# Patient Record
Sex: MALE | Race: WHITE | NOT HISPANIC OR LATINO | Employment: OTHER | URBAN - METROPOLITAN AREA
[De-identification: names, ages, dates, MRNs, and addresses within clinical notes are randomized per-mention and may not be internally consistent; named-entity substitution may affect disease eponyms.]

---

## 2021-08-03 ENCOUNTER — TRANSFERRED RECORDS (OUTPATIENT)
Dept: HEALTH INFORMATION MANAGEMENT | Facility: CLINIC | Age: 61
End: 2021-08-03

## 2021-08-03 LAB
ALT SERPL-CCNC: 27 U/L (ref 12–78)
AST SERPL-CCNC: 16 U/L (ref 15–37)
CREATININE (EXTERNAL): 1.2 MG/DL (ref 0.1–1.3)
GFR ESTIMATED (EXTERNAL): >60 ML/MIN/1.73M2
GFR ESTIMATED (IF AFRICAN AMERICAN) (EXTERNAL): >60 ML/MIN/1.73M2
GLUCOSE (EXTERNAL): 200 MG/DL (ref 70–110)
POTASSIUM (EXTERNAL): 3.1 MMOL/L (ref 3.5–5.2)

## 2021-08-04 ENCOUNTER — APPOINTMENT (OUTPATIENT)
Dept: GENERAL RADIOLOGY | Facility: CLINIC | Age: 61
DRG: 327 | End: 2021-08-04
Attending: THORACIC SURGERY (CARDIOTHORACIC VASCULAR SURGERY)
Payer: COMMERCIAL

## 2021-08-04 ENCOUNTER — TRANSFERRED RECORDS (OUTPATIENT)
Dept: HEALTH INFORMATION MANAGEMENT | Facility: CLINIC | Age: 61
End: 2021-08-04

## 2021-08-04 ENCOUNTER — ANESTHESIA EVENT (OUTPATIENT)
Dept: SURGERY | Facility: CLINIC | Age: 61
DRG: 327 | End: 2021-08-04
Payer: COMMERCIAL

## 2021-08-04 ENCOUNTER — ANESTHESIA (OUTPATIENT)
Dept: SURGERY | Facility: CLINIC | Age: 61
DRG: 327 | End: 2021-08-04
Payer: COMMERCIAL

## 2021-08-04 ENCOUNTER — APPOINTMENT (OUTPATIENT)
Dept: GENERAL RADIOLOGY | Facility: CLINIC | Age: 61
DRG: 327 | End: 2021-08-04
Payer: COMMERCIAL

## 2021-08-04 ENCOUNTER — HOSPITAL ENCOUNTER (INPATIENT)
Facility: CLINIC | Age: 61
LOS: 6 days | Discharge: HOME HEALTH | DRG: 327 | End: 2021-08-10
Attending: EMERGENCY MEDICINE | Admitting: THORACIC SURGERY (CARDIOTHORACIC VASCULAR SURGERY)
Payer: COMMERCIAL

## 2021-08-04 DIAGNOSIS — K22.89: Primary | ICD-10-CM

## 2021-08-04 DIAGNOSIS — K22.3 ESOPHAGEAL RUPTURE: ICD-10-CM

## 2021-08-04 DIAGNOSIS — J98.51 MEDIASTINITIS: ICD-10-CM

## 2021-08-04 DIAGNOSIS — Z11.52 ENCOUNTER FOR SCREENING LABORATORY TESTING FOR SEVERE ACUTE RESPIRATORY SYNDROME CORONAVIRUS 2 (SARS-COV-2): ICD-10-CM

## 2021-08-04 LAB
ABO/RH(D): NORMAL
ALBUMIN SERPL-MCNC: 4 G/DL (ref 3.4–5)
ALP SERPL-CCNC: 44 U/L (ref 40–150)
ALT SERPL W P-5'-P-CCNC: 26 U/L (ref 0–70)
ANION GAP SERPL CALCULATED.3IONS-SCNC: 3 MMOL/L (ref 3–14)
ANION GAP SERPL CALCULATED.3IONS-SCNC: 7 MMOL/L (ref 3–14)
ANTIBODY SCREEN: NEGATIVE
APTT PPP: 25 SECONDS (ref 22–38)
AST SERPL W P-5'-P-CCNC: 13 U/L (ref 0–45)
BASOPHILS # BLD AUTO: 0 10E3/UL (ref 0–0.2)
BASOPHILS NFR BLD AUTO: 0 %
BILIRUB SERPL-MCNC: 0.9 MG/DL (ref 0.2–1.3)
BLD PROD TYP BPU: NORMAL
BLD PROD TYP BPU: NORMAL
BLOOD COMPONENT TYPE: NORMAL
BLOOD COMPONENT TYPE: NORMAL
BUN SERPL-MCNC: 24 MG/DL (ref 7–30)
BUN SERPL-MCNC: 24 MG/DL (ref 7–30)
CALCIUM SERPL-MCNC: 8.6 MG/DL (ref 8.5–10.1)
CALCIUM SERPL-MCNC: 9 MG/DL (ref 8.5–10.1)
CHLORIDE BLD-SCNC: 110 MMOL/L (ref 94–109)
CHLORIDE BLD-SCNC: 110 MMOL/L (ref 94–109)
CO2 SERPL-SCNC: 24 MMOL/L (ref 20–32)
CO2 SERPL-SCNC: 29 MMOL/L (ref 20–32)
CODING SYSTEM: NORMAL
CODING SYSTEM: NORMAL
CREAT SERPL-MCNC: 0.95 MG/DL (ref 0.66–1.25)
CREAT SERPL-MCNC: 0.97 MG/DL (ref 0.66–1.25)
CROSSMATCH: NORMAL
CROSSMATCH: NORMAL
EOSINOPHIL # BLD AUTO: 0 10E3/UL (ref 0–0.7)
EOSINOPHIL NFR BLD AUTO: 0 %
ERYTHROCYTE [DISTWIDTH] IN BLOOD BY AUTOMATED COUNT: 13.2 % (ref 10–15)
ERYTHROCYTE [DISTWIDTH] IN BLOOD BY AUTOMATED COUNT: 13.4 % (ref 10–15)
GFR SERPL CREATININE-BSD FRML MDRD: 83 ML/MIN/1.73M2
GFR SERPL CREATININE-BSD FRML MDRD: 85 ML/MIN/1.73M2
GLUCOSE BLD-MCNC: 198 MG/DL (ref 70–99)
GLUCOSE BLD-MCNC: 232 MG/DL (ref 70–99)
GLUCOSE BLDC GLUCOMTR-MCNC: 162 MG/DL (ref 70–99)
GLUCOSE BLDC GLUCOMTR-MCNC: 224 MG/DL (ref 70–99)
HBA1C MFR BLD: 6 % (ref 0–5.6)
HCT VFR BLD AUTO: 47.6 % (ref 40–53)
HCT VFR BLD AUTO: 49.8 % (ref 40–53)
HGB BLD-MCNC: 13.2 G/DL (ref 13.3–17.7)
HGB BLD-MCNC: 15.9 G/DL (ref 13.3–17.7)
HGB BLD-MCNC: 17 G/DL (ref 13.3–17.7)
HOLD SPECIMEN: NORMAL
IMM GRANULOCYTES # BLD: 0.2 10E3/UL
IMM GRANULOCYTES NFR BLD: 1 %
INR PPP: 1.09 (ref 0.85–1.15)
ISSUE DATE AND TIME: NORMAL
ISSUE DATE AND TIME: NORMAL
LYMPHOCYTES # BLD AUTO: 0.5 10E3/UL (ref 0.8–5.3)
LYMPHOCYTES NFR BLD AUTO: 2 %
MCH RBC QN AUTO: 30.1 PG (ref 26.5–33)
MCH RBC QN AUTO: 30.3 PG (ref 26.5–33)
MCHC RBC AUTO-ENTMCNC: 33.4 G/DL (ref 31.5–36.5)
MCHC RBC AUTO-ENTMCNC: 34.1 G/DL (ref 31.5–36.5)
MCV RBC AUTO: 89 FL (ref 78–100)
MCV RBC AUTO: 90 FL (ref 78–100)
MONOCYTES # BLD AUTO: 1 10E3/UL (ref 0–1.3)
MONOCYTES NFR BLD AUTO: 5 %
NEUTROPHILS # BLD AUTO: 18.6 10E3/UL (ref 1.6–8.3)
NEUTROPHILS NFR BLD AUTO: 92 %
NRBC # BLD AUTO: 0 10E3/UL
NRBC BLD AUTO-RTO: 0 /100
PLATELET # BLD AUTO: 247 10E3/UL (ref 150–450)
PLATELET # BLD AUTO: 250 10E3/UL (ref 150–450)
POTASSIUM BLD-SCNC: 3.7 MMOL/L (ref 3.4–5.3)
POTASSIUM BLD-SCNC: 3.8 MMOL/L (ref 3.4–5.3)
PROT SERPL-MCNC: 7.6 G/DL (ref 6.8–8.8)
RBC # BLD AUTO: 5.28 10E6/UL (ref 4.4–5.9)
RBC # BLD AUTO: 5.61 10E6/UL (ref 4.4–5.9)
SARS-COV-2 RNA RESP QL NAA+PROBE: NEGATIVE
SODIUM SERPL-SCNC: 141 MMOL/L (ref 133–144)
SODIUM SERPL-SCNC: 142 MMOL/L (ref 133–144)
SPECIMEN EXPIRATION DATE: NORMAL
UNIT ABO/RH: NORMAL
UNIT ABO/RH: NORMAL
UNIT NUMBER: NORMAL
UNIT NUMBER: NORMAL
UNIT STATUS: NORMAL
UNIT STATUS: NORMAL
UNIT TYPE ISBT: 6200
UNIT TYPE ISBT: 6200
WBC # BLD AUTO: 16.2 10E3/UL (ref 4–11)
WBC # BLD AUTO: 20.3 10E3/UL (ref 4–11)

## 2021-08-04 PROCEDURE — 85610 PROTHROMBIN TIME: CPT | Performed by: EMERGENCY MEDICINE

## 2021-08-04 PROCEDURE — 258N000003 HC RX IP 258 OP 636: Performed by: THORACIC SURGERY (CARDIOTHORACIC VASCULAR SURGERY)

## 2021-08-04 PROCEDURE — 250N000011 HC RX IP 250 OP 636: Performed by: STUDENT IN AN ORGANIZED HEALTH CARE EDUCATION/TRAINING PROGRAM

## 2021-08-04 PROCEDURE — 999N000141 HC STATISTIC PRE-PROCEDURE NURSING ASSESSMENT: Performed by: THORACIC SURGERY (CARDIOTHORACIC VASCULAR SURGERY)

## 2021-08-04 PROCEDURE — 80053 COMPREHEN METABOLIC PANEL: CPT | Performed by: EMERGENCY MEDICINE

## 2021-08-04 PROCEDURE — 999N000065 XR CHEST PORT 1 VIEW

## 2021-08-04 PROCEDURE — P9016 RBC LEUKOCYTES REDUCED: HCPCS | Performed by: EMERGENCY MEDICINE

## 2021-08-04 PROCEDURE — 86900 BLOOD TYPING SEROLOGIC ABO: CPT | Performed by: EMERGENCY MEDICINE

## 2021-08-04 PROCEDURE — C1769 GUIDE WIRE: HCPCS | Performed by: THORACIC SURGERY (CARDIOTHORACIC VASCULAR SURGERY)

## 2021-08-04 PROCEDURE — 120N000003 HC R&B IMCU UMMC

## 2021-08-04 PROCEDURE — C1894 INTRO/SHEATH, NON-LASER: HCPCS | Performed by: THORACIC SURGERY (CARDIOTHORACIC VASCULAR SURGERY)

## 2021-08-04 PROCEDURE — C9803 HOPD COVID-19 SPEC COLLECT: HCPCS | Performed by: EMERGENCY MEDICINE

## 2021-08-04 PROCEDURE — 710N000011 HC RECOVERY PHASE 1, LEVEL 3, PER MIN: Performed by: THORACIC SURGERY (CARDIOTHORACIC VASCULAR SURGERY)

## 2021-08-04 PROCEDURE — 999N000179 XR SURGERY CARM FLUORO LESS THAN 5 MIN W STILLS: Mod: TC

## 2021-08-04 PROCEDURE — 99285 EMERGENCY DEPT VISIT HI MDM: CPT | Mod: 25 | Performed by: EMERGENCY MEDICINE

## 2021-08-04 PROCEDURE — 86923 COMPATIBILITY TEST ELECTRIC: CPT | Performed by: EMERGENCY MEDICINE

## 2021-08-04 PROCEDURE — 250N000009 HC RX 250: Performed by: NURSE ANESTHETIST, CERTIFIED REGISTERED

## 2021-08-04 PROCEDURE — 250N000013 HC RX MED GY IP 250 OP 250 PS 637

## 2021-08-04 PROCEDURE — 999N000015 HC STATISTIC ARTERIAL MONITORING DAILY

## 2021-08-04 PROCEDURE — 0W9B3ZZ DRAINAGE OF LEFT PLEURAL CAVITY, PERCUTANEOUS APPROACH: ICD-10-PCS | Performed by: THORACIC SURGERY (CARDIOTHORACIC VASCULAR SURGERY)

## 2021-08-04 PROCEDURE — 42955 SURGICAL OPENING OF THROAT: CPT | Mod: GC | Performed by: THORACIC SURGERY (CARDIOTHORACIC VASCULAR SURGERY)

## 2021-08-04 PROCEDURE — 36415 COLL VENOUS BLD VENIPUNCTURE: CPT | Performed by: EMERGENCY MEDICINE

## 2021-08-04 PROCEDURE — 360N000078 HC SURGERY LEVEL 5, PER MIN: Performed by: THORACIC SURGERY (CARDIOTHORACIC VASCULAR SURGERY)

## 2021-08-04 PROCEDURE — 85025 COMPLETE CBC W/AUTO DIFF WBC: CPT | Performed by: EMERGENCY MEDICINE

## 2021-08-04 PROCEDURE — 250N000013 HC RX MED GY IP 250 OP 250 PS 637: Performed by: STUDENT IN AN ORGANIZED HEALTH CARE EDUCATION/TRAINING PROGRAM

## 2021-08-04 PROCEDURE — 258N000003 HC RX IP 258 OP 636: Performed by: STUDENT IN AN ORGANIZED HEALTH CARE EDUCATION/TRAINING PROGRAM

## 2021-08-04 PROCEDURE — 272N000001 HC OR GENERAL SUPPLY STERILE: Performed by: THORACIC SURGERY (CARDIOTHORACIC VASCULAR SURGERY)

## 2021-08-04 PROCEDURE — 85730 THROMBOPLASTIN TIME PARTIAL: CPT | Performed by: EMERGENCY MEDICINE

## 2021-08-04 PROCEDURE — 250N000011 HC RX IP 250 OP 636: Performed by: THORACIC SURGERY (CARDIOTHORACIC VASCULAR SURGERY)

## 2021-08-04 PROCEDURE — 43246 EGD PLACE GASTROSTOMY TUBE: CPT | Mod: GC | Performed by: THORACIC SURGERY (CARDIOTHORACIC VASCULAR SURGERY)

## 2021-08-04 PROCEDURE — 258N000003 HC RX IP 258 OP 636: Performed by: ANESTHESIOLOGY

## 2021-08-04 PROCEDURE — 99291 CRITICAL CARE FIRST HOUR: CPT | Performed by: EMERGENCY MEDICINE

## 2021-08-04 PROCEDURE — 0DC28ZZ EXTIRPATION OF MATTER FROM MIDDLE ESOPHAGUS, VIA NATURAL OR ARTIFICIAL OPENING ENDOSCOPIC: ICD-10-PCS | Performed by: THORACIC SURGERY (CARDIOTHORACIC VASCULAR SURGERY)

## 2021-08-04 PROCEDURE — 71045 X-RAY EXAM CHEST 1 VIEW: CPT | Mod: 26 | Performed by: STUDENT IN AN ORGANIZED HEALTH CARE EDUCATION/TRAINING PROGRAM

## 2021-08-04 PROCEDURE — 0D153J4 BYPASS ESOPHAGUS TO CUTANEOUS WITH SYNTHETIC SUBSTITUTE, PERCUTANEOUS APPROACH: ICD-10-PCS | Performed by: THORACIC SURGERY (CARDIOTHORACIC VASCULAR SURGERY)

## 2021-08-04 PROCEDURE — 0DHA3UZ INSERTION OF FEEDING DEVICE INTO JEJUNUM, PERCUTANEOUS APPROACH: ICD-10-PCS | Performed by: THORACIC SURGERY (CARDIOTHORACIC VASCULAR SURGERY)

## 2021-08-04 PROCEDURE — 85018 HEMOGLOBIN: CPT | Performed by: STUDENT IN AN ORGANIZED HEALTH CARE EDUCATION/TRAINING PROGRAM

## 2021-08-04 PROCEDURE — 250N000024 HC ISOFLURANE, PER MIN: Performed by: THORACIC SURGERY (CARDIOTHORACIC VASCULAR SURGERY)

## 2021-08-04 PROCEDURE — 36592 COLLECT BLOOD FROM PICC: CPT | Performed by: STUDENT IN AN ORGANIZED HEALTH CARE EDUCATION/TRAINING PROGRAM

## 2021-08-04 PROCEDURE — 250N000011 HC RX IP 250 OP 636: Performed by: NURSE ANESTHETIST, CERTIFIED REGISTERED

## 2021-08-04 PROCEDURE — 250N000011 HC RX IP 250 OP 636: Performed by: ANESTHESIOLOGY

## 2021-08-04 PROCEDURE — 0W993ZZ DRAINAGE OF RIGHT PLEURAL CAVITY, PERCUTANEOUS APPROACH: ICD-10-PCS | Performed by: THORACIC SURGERY (CARDIOTHORACIC VASCULAR SURGERY)

## 2021-08-04 PROCEDURE — U0005 INFEC AGEN DETEC AMPLI PROBE: HCPCS | Performed by: EMERGENCY MEDICINE

## 2021-08-04 PROCEDURE — 370N000017 HC ANESTHESIA TECHNICAL FEE, PER MIN: Performed by: THORACIC SURGERY (CARDIOTHORACIC VASCULAR SURGERY)

## 2021-08-04 PROCEDURE — 999N000157 HC STATISTIC RCP TIME EA 10 MIN

## 2021-08-04 PROCEDURE — 250N000009 HC RX 250: Performed by: ANESTHESIOLOGY

## 2021-08-04 PROCEDURE — 258N000003 HC RX IP 258 OP 636: Performed by: NURSE ANESTHETIST, CERTIFIED REGISTERED

## 2021-08-04 PROCEDURE — 85041 AUTOMATED RBC COUNT: CPT | Performed by: STUDENT IN AN ORGANIZED HEALTH CARE EDUCATION/TRAINING PROGRAM

## 2021-08-04 PROCEDURE — 83036 HEMOGLOBIN GLYCOSYLATED A1C: CPT

## 2021-08-04 RX ORDER — ONDANSETRON 4 MG/1
4 TABLET, ORALLY DISINTEGRATING ORAL EVERY 30 MIN PRN
Status: DISCONTINUED | OUTPATIENT
Start: 2021-08-04 | End: 2021-08-04 | Stop reason: HOSPADM

## 2021-08-04 RX ORDER — FENTANYL CITRATE 50 UG/ML
INJECTION, SOLUTION INTRAMUSCULAR; INTRAVENOUS PRN
Status: DISCONTINUED | OUTPATIENT
Start: 2021-08-04 | End: 2021-08-04

## 2021-08-04 RX ORDER — ACETAMINOPHEN 325 MG/1
975 TABLET ORAL EVERY 6 HOURS PRN
Status: DISCONTINUED | OUTPATIENT
Start: 2021-08-04 | End: 2021-08-04 | Stop reason: HOSPADM

## 2021-08-04 RX ORDER — PANTOPRAZOLE SODIUM 40 MG/1
40 TABLET, DELAYED RELEASE ORAL DAILY
Status: DISCONTINUED | OUTPATIENT
Start: 2021-08-04 | End: 2021-08-05

## 2021-08-04 RX ORDER — OXYCODONE HYDROCHLORIDE 5 MG/1
5 TABLET ORAL EVERY 4 HOURS PRN
Status: DISCONTINUED | OUTPATIENT
Start: 2021-08-04 | End: 2021-08-05

## 2021-08-04 RX ORDER — ONDANSETRON 2 MG/ML
4 INJECTION INTRAMUSCULAR; INTRAVENOUS EVERY 6 HOURS PRN
Status: DISCONTINUED | OUTPATIENT
Start: 2021-08-04 | End: 2021-08-10 | Stop reason: HOSPADM

## 2021-08-04 RX ORDER — ONDANSETRON 2 MG/ML
INJECTION INTRAMUSCULAR; INTRAVENOUS PRN
Status: DISCONTINUED | OUTPATIENT
Start: 2021-08-04 | End: 2021-08-04

## 2021-08-04 RX ORDER — NALOXONE HYDROCHLORIDE 0.4 MG/ML
0.4 INJECTION, SOLUTION INTRAMUSCULAR; INTRAVENOUS; SUBCUTANEOUS
Status: DISCONTINUED | OUTPATIENT
Start: 2021-08-04 | End: 2021-08-10 | Stop reason: HOSPADM

## 2021-08-04 RX ORDER — DIMENHYDRINATE 50 MG/ML
25 INJECTION, SOLUTION INTRAMUSCULAR; INTRAVENOUS
Status: DISCONTINUED | OUTPATIENT
Start: 2021-08-04 | End: 2021-08-04 | Stop reason: HOSPADM

## 2021-08-04 RX ORDER — FLUCONAZOLE 2 MG/ML
200 INJECTION, SOLUTION INTRAVENOUS EVERY 24 HOURS
Status: DISCONTINUED | OUTPATIENT
Start: 2021-08-04 | End: 2021-08-07

## 2021-08-04 RX ORDER — HYDRALAZINE HYDROCHLORIDE 20 MG/ML
2.5-5 INJECTION INTRAMUSCULAR; INTRAVENOUS EVERY 10 MIN PRN
Status: DISCONTINUED | OUTPATIENT
Start: 2021-08-04 | End: 2021-08-04 | Stop reason: HOSPADM

## 2021-08-04 RX ORDER — LISINOPRIL/HYDROCHLOROTHIAZIDE 10-12.5 MG
1 TABLET ORAL DAILY
Status: ON HOLD | COMMUNITY
End: 2021-08-10

## 2021-08-04 RX ORDER — ACETAMINOPHEN 325 MG/10.15ML
650 LIQUID ORAL EVERY 4 HOURS PRN
Status: DISCONTINUED | OUTPATIENT
Start: 2021-08-04 | End: 2021-08-06

## 2021-08-04 RX ORDER — LABETALOL 20 MG/4 ML (5 MG/ML) INTRAVENOUS SYRINGE
PRN
Status: DISCONTINUED | OUTPATIENT
Start: 2021-08-04 | End: 2021-08-04

## 2021-08-04 RX ORDER — FENTANYL CITRATE 50 UG/ML
25 INJECTION, SOLUTION INTRAMUSCULAR; INTRAVENOUS EVERY 5 MIN PRN
Status: DISCONTINUED | OUTPATIENT
Start: 2021-08-04 | End: 2021-08-04 | Stop reason: HOSPADM

## 2021-08-04 RX ORDER — HYDROMORPHONE HCL IN WATER/PF 6 MG/30 ML
0.2 PATIENT CONTROLLED ANALGESIA SYRINGE INTRAVENOUS
Status: DISCONTINUED | OUTPATIENT
Start: 2021-08-04 | End: 2021-08-10 | Stop reason: HOSPADM

## 2021-08-04 RX ORDER — HYDROMORPHONE HCL IN WATER/PF 6 MG/30 ML
0.4 PATIENT CONTROLLED ANALGESIA SYRINGE INTRAVENOUS
Status: DISCONTINUED | OUTPATIENT
Start: 2021-08-04 | End: 2021-08-10 | Stop reason: HOSPADM

## 2021-08-04 RX ORDER — NALOXONE HYDROCHLORIDE 0.4 MG/ML
0.2 INJECTION, SOLUTION INTRAMUSCULAR; INTRAVENOUS; SUBCUTANEOUS
Status: DISCONTINUED | OUTPATIENT
Start: 2021-08-04 | End: 2021-08-10 | Stop reason: HOSPADM

## 2021-08-04 RX ORDER — LABETALOL HYDROCHLORIDE 5 MG/ML
10 INJECTION, SOLUTION INTRAVENOUS
Status: COMPLETED | OUTPATIENT
Start: 2021-08-04 | End: 2021-08-04

## 2021-08-04 RX ORDER — HYDROMORPHONE HYDROCHLORIDE 1 MG/ML
0.2 INJECTION, SOLUTION INTRAMUSCULAR; INTRAVENOUS; SUBCUTANEOUS EVERY 5 MIN PRN
Status: DISCONTINUED | OUTPATIENT
Start: 2021-08-04 | End: 2021-08-04

## 2021-08-04 RX ORDER — ONDANSETRON 2 MG/ML
4 INJECTION INTRAMUSCULAR; INTRAVENOUS EVERY 30 MIN PRN
Status: DISCONTINUED | OUTPATIENT
Start: 2021-08-04 | End: 2021-08-04 | Stop reason: HOSPADM

## 2021-08-04 RX ORDER — DEXTROSE MONOHYDRATE 25 G/50ML
25-50 INJECTION, SOLUTION INTRAVENOUS
Status: DISCONTINUED | OUTPATIENT
Start: 2021-08-04 | End: 2021-08-10 | Stop reason: HOSPADM

## 2021-08-04 RX ORDER — PROPOFOL 10 MG/ML
INJECTION, EMULSION INTRAVENOUS PRN
Status: DISCONTINUED | OUTPATIENT
Start: 2021-08-04 | End: 2021-08-04

## 2021-08-04 RX ORDER — NICOTINE POLACRILEX 4 MG
15-30 LOZENGE BUCCAL
Status: DISCONTINUED | OUTPATIENT
Start: 2021-08-04 | End: 2021-08-10 | Stop reason: HOSPADM

## 2021-08-04 RX ORDER — DEXMEDETOMIDINE HYDROCHLORIDE 4 UG/ML
0.2-0.7 INJECTION, SOLUTION INTRAVENOUS CONTINUOUS
Status: DISCONTINUED | OUTPATIENT
Start: 2021-08-04 | End: 2021-08-04 | Stop reason: HOSPADM

## 2021-08-04 RX ORDER — FLUCONAZOLE 2 MG/ML
200 INJECTION, SOLUTION INTRAVENOUS EVERY 24 HOURS
Status: DISCONTINUED | OUTPATIENT
Start: 2021-08-04 | End: 2021-08-04

## 2021-08-04 RX ORDER — POLYETHYLENE GLYCOL 3350 17 G/17G
17 POWDER, FOR SOLUTION ORAL DAILY
Status: DISCONTINUED | OUTPATIENT
Start: 2021-08-05 | End: 2021-08-10 | Stop reason: HOSPADM

## 2021-08-04 RX ORDER — ALBUTEROL SULFATE 0.83 MG/ML
2.5 SOLUTION RESPIRATORY (INHALATION) EVERY 4 HOURS PRN
Status: DISCONTINUED | OUTPATIENT
Start: 2021-08-04 | End: 2021-08-04 | Stop reason: HOSPADM

## 2021-08-04 RX ORDER — DIPHENHYDRAMINE HYDROCHLORIDE 50 MG/ML
25 INJECTION INTRAMUSCULAR; INTRAVENOUS EVERY 6 HOURS PRN
Status: DISCONTINUED | OUTPATIENT
Start: 2021-08-04 | End: 2021-08-04 | Stop reason: HOSPADM

## 2021-08-04 RX ORDER — DEXTROSE MONOHYDRATE, SODIUM CHLORIDE, AND POTASSIUM CHLORIDE 50; 1.49; 4.5 G/1000ML; G/1000ML; G/1000ML
INJECTION, SOLUTION INTRAVENOUS CONTINUOUS
Status: DISCONTINUED | OUTPATIENT
Start: 2021-08-04 | End: 2021-08-06

## 2021-08-04 RX ORDER — LIDOCAINE HYDROCHLORIDE 20 MG/ML
INJECTION, SOLUTION INFILTRATION; PERINEURAL PRN
Status: DISCONTINUED | OUTPATIENT
Start: 2021-08-04 | End: 2021-08-04

## 2021-08-04 RX ORDER — ONDANSETRON 4 MG/1
4 TABLET, ORALLY DISINTEGRATING ORAL EVERY 6 HOURS PRN
Status: DISCONTINUED | OUTPATIENT
Start: 2021-08-04 | End: 2021-08-10 | Stop reason: HOSPADM

## 2021-08-04 RX ORDER — PIPERACILLIN SODIUM, TAZOBACTAM SODIUM 3; .375 G/15ML; G/15ML
3.38 INJECTION, POWDER, LYOPHILIZED, FOR SOLUTION INTRAVENOUS EVERY 6 HOURS
Status: DISCONTINUED | OUTPATIENT
Start: 2021-08-04 | End: 2021-08-06

## 2021-08-04 RX ORDER — PROCHLORPERAZINE MALEATE 5 MG
10 TABLET ORAL EVERY 6 HOURS PRN
Status: DISCONTINUED | OUTPATIENT
Start: 2021-08-04 | End: 2021-08-10 | Stop reason: HOSPADM

## 2021-08-04 RX ORDER — PIPERACILLIN SODIUM, TAZOBACTAM SODIUM 3; .375 G/15ML; G/15ML
INJECTION, POWDER, LYOPHILIZED, FOR SOLUTION INTRAVENOUS PRN
Status: DISCONTINUED | OUTPATIENT
Start: 2021-08-04 | End: 2021-08-04

## 2021-08-04 RX ORDER — LIDOCAINE 4 G/G
1 PATCH TOPICAL
Status: COMPLETED | OUTPATIENT
Start: 2021-08-04 | End: 2021-08-05

## 2021-08-04 RX ORDER — DIPHENHYDRAMINE HCL 25 MG
25 CAPSULE ORAL EVERY 6 HOURS PRN
Status: DISCONTINUED | OUTPATIENT
Start: 2021-08-04 | End: 2021-08-04 | Stop reason: HOSPADM

## 2021-08-04 RX ORDER — DEXTROSE MONOHYDRATE 100 MG/ML
INJECTION, SOLUTION INTRAVENOUS CONTINUOUS PRN
Status: DISCONTINUED | OUTPATIENT
Start: 2021-08-04 | End: 2021-08-10 | Stop reason: HOSPADM

## 2021-08-04 RX ORDER — HYDROMORPHONE HCL IN WATER/PF 6 MG/30 ML
.2-.4 PATIENT CONTROLLED ANALGESIA SYRINGE INTRAVENOUS
Status: DISCONTINUED | OUTPATIENT
Start: 2021-08-04 | End: 2021-08-04

## 2021-08-04 RX ORDER — HALOPERIDOL 5 MG/ML
1 INJECTION INTRAMUSCULAR
Status: COMPLETED | OUTPATIENT
Start: 2021-08-04 | End: 2021-08-04

## 2021-08-04 RX ORDER — SODIUM CHLORIDE, SODIUM LACTATE, POTASSIUM CHLORIDE, CALCIUM CHLORIDE 600; 310; 30; 20 MG/100ML; MG/100ML; MG/100ML; MG/100ML
INJECTION, SOLUTION INTRAVENOUS CONTINUOUS
Status: DISCONTINUED | OUTPATIENT
Start: 2021-08-04 | End: 2021-08-04 | Stop reason: HOSPADM

## 2021-08-04 RX ADMIN — HYDROMORPHONE HYDROCHLORIDE 0.2 MG: 0.2 INJECTION, SOLUTION INTRAMUSCULAR; INTRAVENOUS; SUBCUTANEOUS at 21:07

## 2021-08-04 RX ADMIN — ROCURONIUM BROMIDE 50 MG: 10 INJECTION INTRAVENOUS at 09:29

## 2021-08-04 RX ADMIN — LABETALOL 20 MG/4 ML (5 MG/ML) INTRAVENOUS SYRINGE 20 MG: at 12:12

## 2021-08-04 RX ADMIN — LIDOCAINE 1 PATCH: 246 PATCH TOPICAL at 19:36

## 2021-08-04 RX ADMIN — HALOPERIDOL LACTATE 1 MG: 5 INJECTION, SOLUTION INTRAMUSCULAR at 12:15

## 2021-08-04 RX ADMIN — ACETAMINOPHEN 650 MG: 325 SOLUTION ORAL at 16:45

## 2021-08-04 RX ADMIN — LIDOCAINE HYDROCHLORIDE 100 MG: 20 INJECTION, SOLUTION INFILTRATION; PERINEURAL at 09:29

## 2021-08-04 RX ADMIN — FENTANYL CITRATE 25 MCG: 50 INJECTION, SOLUTION INTRAMUSCULAR; INTRAVENOUS at 13:30

## 2021-08-04 RX ADMIN — ONDANSETRON 4 MG: 2 INJECTION INTRAMUSCULAR; INTRAVENOUS at 09:12

## 2021-08-04 RX ADMIN — PROPOFOL 180 MG: 10 INJECTION, EMULSION INTRAVENOUS at 09:29

## 2021-08-04 RX ADMIN — PHENYLEPHRINE HYDROCHLORIDE 100 MCG: 10 INJECTION INTRAVENOUS at 10:07

## 2021-08-04 RX ADMIN — FENTANYL CITRATE 25 MCG: 50 INJECTION, SOLUTION INTRAMUSCULAR; INTRAVENOUS at 12:30

## 2021-08-04 RX ADMIN — PANTOPRAZOLE SODIUM 40 MG: 40 TABLET, DELAYED RELEASE ORAL at 16:45

## 2021-08-04 RX ADMIN — MIDAZOLAM 1 MG: 1 INJECTION INTRAMUSCULAR; INTRAVENOUS at 09:17

## 2021-08-04 RX ADMIN — LABETALOL HYDROCHLORIDE 10 MG: 5 INJECTION, SOLUTION INTRAVENOUS at 13:32

## 2021-08-04 RX ADMIN — FENTANYL CITRATE 50 MCG: 50 INJECTION, SOLUTION INTRAMUSCULAR; INTRAVENOUS at 10:34

## 2021-08-04 RX ADMIN — PIPERACILLIN AND TAZOBACTAM 3.38 G: 3; .375 INJECTION, POWDER, FOR SOLUTION INTRAVENOUS at 10:20

## 2021-08-04 RX ADMIN — PHENYLEPHRINE HYDROCHLORIDE 200 MCG: 10 INJECTION INTRAVENOUS at 10:35

## 2021-08-04 RX ADMIN — HYDROMORPHONE HYDROCHLORIDE 0.1 MG: 1 INJECTION, SOLUTION INTRAMUSCULAR; INTRAVENOUS; SUBCUTANEOUS at 12:30

## 2021-08-04 RX ADMIN — ENOXAPARIN SODIUM 40 MG: 40 INJECTION, SOLUTION INTRAVENOUS; SUBCUTANEOUS at 08:19

## 2021-08-04 RX ADMIN — FLUCONAZOLE IN SODIUM CHLORIDE 200 MG: 2 INJECTION, SOLUTION INTRAVENOUS at 15:05

## 2021-08-04 RX ADMIN — MIDAZOLAM 1 MG: 1 INJECTION INTRAMUSCULAR; INTRAVENOUS at 09:12

## 2021-08-04 RX ADMIN — HYDROMORPHONE HYDROCHLORIDE 0.2 MG: 0.2 INJECTION, SOLUTION INTRAMUSCULAR; INTRAVENOUS; SUBCUTANEOUS at 19:02

## 2021-08-04 RX ADMIN — SODIUM CHLORIDE, POTASSIUM CHLORIDE, SODIUM LACTATE AND CALCIUM CHLORIDE: 600; 310; 30; 20 INJECTION, SOLUTION INTRAVENOUS at 08:45

## 2021-08-04 RX ADMIN — FENTANYL CITRATE 100 MCG: 50 INJECTION, SOLUTION INTRAMUSCULAR; INTRAVENOUS at 11:30

## 2021-08-04 RX ADMIN — DEXMEDETOMIDINE 0.2 MCG/KG/HR: 100 INJECTION, SOLUTION, CONCENTRATE INTRAVENOUS at 13:14

## 2021-08-04 RX ADMIN — FENTANYL CITRATE 25 MCG: 50 INJECTION, SOLUTION INTRAMUSCULAR; INTRAVENOUS at 13:25

## 2021-08-04 RX ADMIN — Medication 12 MCG: at 12:28

## 2021-08-04 RX ADMIN — LABETALOL 20 MG/4 ML (5 MG/ML) INTRAVENOUS SYRINGE 20 MG: at 12:01

## 2021-08-04 RX ADMIN — ROCURONIUM BROMIDE 30 MG: 10 INJECTION INTRAVENOUS at 10:34

## 2021-08-04 RX ADMIN — SUGAMMADEX 200 MG: 100 INJECTION, SOLUTION INTRAVENOUS at 11:55

## 2021-08-04 RX ADMIN — PHENYLEPHRINE HYDROCHLORIDE 100 MCG: 10 INJECTION INTRAVENOUS at 10:16

## 2021-08-04 RX ADMIN — FENTANYL CITRATE 200 MCG: 50 INJECTION, SOLUTION INTRAMUSCULAR; INTRAVENOUS at 09:29

## 2021-08-04 RX ADMIN — HYDROMORPHONE HYDROCHLORIDE 0.5 MG: 1 INJECTION, SOLUTION INTRAMUSCULAR; INTRAVENOUS; SUBCUTANEOUS at 12:04

## 2021-08-04 RX ADMIN — POTASSIUM CHLORIDE, DEXTROSE MONOHYDRATE AND SODIUM CHLORIDE: 150; 5; 450 INJECTION, SOLUTION INTRAVENOUS at 15:26

## 2021-08-04 RX ADMIN — HYDROMORPHONE HYDROCHLORIDE 0.2 MG: 1 INJECTION, SOLUTION INTRAMUSCULAR; INTRAVENOUS; SUBCUTANEOUS at 12:20

## 2021-08-04 RX ADMIN — FENTANYL CITRATE 25 MCG: 50 INJECTION, SOLUTION INTRAMUSCULAR; INTRAVENOUS at 12:58

## 2021-08-04 RX ADMIN — FENTANYL CITRATE 25 MCG: 50 INJECTION, SOLUTION INTRAMUSCULAR; INTRAVENOUS at 12:25

## 2021-08-04 RX ADMIN — VANCOMYCIN HYDROCHLORIDE 1250 MG: 100 INJECTION, POWDER, LYOPHILIZED, FOR SOLUTION INTRAVENOUS at 16:24

## 2021-08-04 RX ADMIN — ACETAMINOPHEN 650 MG: 325 SOLUTION ORAL at 21:06

## 2021-08-04 RX ADMIN — FENTANYL CITRATE 25 MCG: 50 INJECTION, SOLUTION INTRAMUSCULAR; INTRAVENOUS at 13:03

## 2021-08-04 RX ADMIN — LABETALOL 20 MG/4 ML (5 MG/ML) INTRAVENOUS SYRINGE 10 MG: at 11:55

## 2021-08-04 RX ADMIN — HYDROMORPHONE HYDROCHLORIDE 0.2 MG: 0.2 INJECTION, SOLUTION INTRAMUSCULAR; INTRAVENOUS; SUBCUTANEOUS at 16:45

## 2021-08-04 RX ADMIN — HYDROMORPHONE HYDROCHLORIDE 0.2 MG: 1 INJECTION, SOLUTION INTRAMUSCULAR; INTRAVENOUS; SUBCUTANEOUS at 12:25

## 2021-08-04 ASSESSMENT — ENCOUNTER SYMPTOMS
DYSURIA: 0
VOMITING: 0
SHORTNESS OF BREATH: 0
BACK PAIN: 1
DIZZINESS: 0
ABDOMINAL PAIN: 0
FEVER: 0
BRUISES/BLEEDS EASILY: 0
NAUSEA: 0
CONFUSION: 0

## 2021-08-04 ASSESSMENT — ACTIVITIES OF DAILY LIVING (ADL)
ADLS_ACUITY_SCORE: 17
ADLS_ACUITY_SCORE: 16

## 2021-08-04 ASSESSMENT — MIFFLIN-ST. JEOR: SCORE: 1913.02

## 2021-08-04 NOTE — PROGRESS NOTES
SURGICAL ICU ADMISSION NOTE  8/4/2021    PRIMARY TEAM: Vascular   PRIMARY PHYSICIAN: Dr. Lee Hameed     REASON FOR CRITICAL CARE ADMISSION: ***   CONSULTING PHYSICIAN: Dr. Garcia     ASSESSMENT:   Janet Urias is a 61 year old male with PMH HTN and chronic dysphagia who presents as a transfer from [place], [place]. He presents after FB sensation in throat since lunch, 8/3/21. Increasing dysphagia culminated in an episode of severe sharp chest pain and back pain concerning for mediastinitis and esophageal rupture. 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 Patient alert and oriented, neuro intact, airway patent. WBC's elevated 23.5. Given 3.375g Zosyn, 40mg Protonix; dilaudid, fentanyl, zofran. Has 18G x 2 in each AC. Flow to [facility] ED, via [facility].    Patient to the OR for Esophagogastroduodenoscopy, GJ tube placement, pharyngoscopy, removal of foreign body, esophogram, and bilateral chest tube placement. Notable for partial thickness esophageal laceration 33 cm - 41 cm from incisors. No complications. Extubated in the OR. 4L NC in PACU.     PLAN:     Neuro/ pain/ sedation:  -Monitor neurological status. Notify the MD for any acute changes in exam.  -*** for pain.  -*** for sedation.     Pulmonary care:   -Supplemental oxygen to keep saturation above 92 %.  -*** Incentive spirometer every 15- 30 minutes when awake.  -***     Cardiovascular:    -Monitor hemodynamic status.   -***  -***     GI care:   -NPO except ice chips and medications.  -***  -***     Fluids/ Electrolytes/ Nutrition:   -*** for IV fluid hydration  -***  -ICU electrolyte replacement protocol  -No indication for parenteral nutrition.  -Nutrition consulted. Appreciate recs     Renal/ Fluid Balance:    -Urine output is *** adequate so far.  -Will continue to monitor intake and output.  -***     Endocrine:    -No management indication. ***  -Sliding scale for diabetes management. ***  -***     ID/  Antibiotics:  -No indication for antibiotics. ***  -***  -***     Heme:     -Hemoglobin stable. ***  -***  -***     Prophylaxis:    -Mechanical prophylaxis for DVT. ***  -No chemical DVT prophylaxis due to high risk of bleeding. ***  -***     MSK:    -PT and OT consulted. Appreciate recs.  -***     Lines/ tubes/ drains:  -***     Disposition:  -Surgical ICU. ***    Patient seen, findings and plan discussed with surgical ICU staff.    Rylan Hagen, MS4     - - - - - - - - - - - - - - - - - - - - - - - - - - - - - - - - - - - - - - - - - - - - - - - - - - - - - - - - - - - - - - - - - - - - - - - -     HISTORY PRESENTING ILLNESS: ***    Janet Urias is a 61 year old male who past medical history of hypertension who presents to the emergency department as a transfer from outside hospital in [facility] for further evaluation of chest pain, back pain, and concerns for mediastinitis and esophageal rupture.  Patient reports that earlier today around 12 noon he was eating pork tracks and had difficulty swallowing, and felt as if something got stuck in his throat.  Patient reports he was unable to eat or drink throughout the day however reports that this is happened in the past and typically resolves.  Patient reports that last night around 8:30 PM he was drinking chocolate milk and also had a piece of toast.  Patient reports sudden onset of severe sharp of chest pain, back pain.  Patient reports pain is worse when lying flat and better when sitting up.  Patient denies any fever, chills.  Patient reports 1 episode of nausea and vomiting.  Denies any abdominal pain.  No other complaints.    REVIEW OF SYSTEMS: 10 point ROS neg other than the symptoms noted above in the HPI.    PAST MEDICAL HISTORY:    has a past medical history of Hypertension.    SURGICAL HISTORY:    has a past surgical history that includes orthopedic surgery.    SOCIAL HISTORY:    reports that he does not use drugs.    FAMILY HISTORY:  No bleeding/clotting disorders nor problems with anesthesia. ***    ALLERGIES:    No Known Allergies    MEDICATIONS:  No current facility-administered medications on file prior to encounter.  lisinopril-hydrochlorothiazide (ZESTORETIC) 10-12.5 MG tablet, Take 1 tablet by mouth daily        PHYSICAL EXAMINATION:  Temp:  [98.3  F (36.8  C)] 98.3  F (36.8  C)  Pulse:  [103-115] 103  Resp:  [16] 16  BP: (134)/(99) 134/99  SpO2:  [87 %-98 %] 98 %  @[facility]@    LABS: Reviewed.   Arterial Blood Gases   No lab results found in last 7 days.  Complete Blood Count   Recent Labs   Lab 08/04/21  0639   WBC 20.3*   HGB 17.0        Basic Metabolic Panel  Recent Labs   Lab 08/04/21  0639      POTASSIUM 3.7   CHLORIDE 110*   CO2 24   BUN 24   CR 0.95   *     Liver Function Tests  Recent Labs   Lab 08/04/21  0639   AST 13   ALT 26   ALKPHOS 44   BILITOTAL 0.9   ALBUMIN 4.0   INR 1.09     Pancreatic Enzymes  No lab results found in last 7 days.  Coagulation Profile  Recent Labs   Lab 08/04/21  0639   INR 1.09   PTT 25     Lactate  Invalid input(s): LACTATE    IMAGING:  No results found for this or any previous visit (from the past 24 hour(s)).

## 2021-08-04 NOTE — ANESTHESIA PREPROCEDURE EVALUATION
Anesthesia Pre-Procedure Evaluation    Patient: Janet Urias   MRN: [id number] : 1960        Preoperative Diagnosis: Esophageal perforation [K22.3]   Procedure : Procedure(s):  ESOPHAGECTOMY     Past Medical History:   Diagnosis Date     Hypertension       Past Surgical History:   Procedure Laterality Date     ORTHOPEDIC SURGERY        No Known Allergies   Social History     Tobacco Use     Smoking status: Unknown If Ever Smoked   Substance Use Topics     Alcohol use: Not on file      Wt Readings from Last 1 Encounters:   21 104.3 kg (230 lb)        Anesthesia Evaluation            ROS/MED HX  ENT/Pulmonary:       Neurologic:       Cardiovascular:     (+) hypertension----- (-) murmur and wheezes   METS/Exercise Tolerance:     Hematologic:       Musculoskeletal:       GI/Hepatic:     (+) esophageal disease,     Renal/Genitourinary:       Endo:       Psychiatric/Substance Use:       Infectious Disease:       Malignancy:       Other:            Physical Exam    Airway        Mallampati: II   TM distance: > 3 FB   Neck ROM: full   Mouth opening: > 3 cm    Respiratory Devices and Support         Dental  no notable dental history         Cardiovascular          Rhythm and rate: regular and normal (-) no systolic click and no murmur    Pulmonary   pulmonary exam normal        breath sounds clear to auscultation   (-) no wheezes        OUTSIDE LABS:  CBC:   Lab Results   Component Value Date    WBC 20.3 (H) 2021    HGB 17.0 2021    HCT 49.8 2021     2021     BMP:   Lab Results   Component Value Date     2021    POTASSIUM 3.7 2021    CHLORIDE 110 (H) 2021    CO2 24 2021    BUN 24 2021    CR 0.95 2021     (H) 2021     COAGS:   Lab Results   Component Value Date    PTT 25 2021    INR 1.09 2021     POC: No results found for: BGM, HCG, HCGS  HEPATIC:   Lab Results   Component Value Date    ALBUMIN 4.0 2021     PROTTOTAL 7.6 08/04/2021    ALT 26 08/04/2021    AST 13 08/04/2021    ALKPHOS 44 08/04/2021    BILITOTAL 0.9 08/04/2021     OTHER:   Lab Results   Component Value Date    ANA 9.0 08/04/2021       Anesthesia Plan    ASA Status:  2, emergent    NPO Status:  NPO Appropriate    Anesthesia Type: General.     - Airway: ETT   Induction: Intravenous.   Maintenance: Inhalation.   Techniques and Equipment:     - Airway: Video-Laryngoscope, Double lumen ETT     - Lines/Monitors: 2nd IV, Arterial Line     Consents    Anesthesia Plan(s) and associated risks, benefits, and realistic alternatives discussed. Questions answered and patient/representative(s) expressed understanding.     - Discussed with:  Patient      - Extended Intubation/Ventilatory Support Discussed: Yes.      - Patient is DNR/DNI Status: No    Use of blood products discussed: Yes.     - Discussed with: Patient.     Postoperative Care    Pain management: IV analgesics.   PONV prophylaxis: Ondansetron (or other 5HT-3), Dexamethasone or Solumedrol     Comments:                Christopher J. Behrens, MD

## 2021-08-04 NOTE — BRIEF OP NOTE
[facility]    Brief Operative Note     Pre-operative diagnosis: Esophageal perforation [K22.3]  Post-operative diagnosis Esophageal laceration  Procedure:  Procedure(s):  Esophagogastroduodenoscopy, GJ tube placement, pharyngoscopy, removal of foreign body, esophogram, and bilateral chest tube placement  Surgeon: Surgeon(s) and Role:     * Pool Aranda MD - Primary      * Abhi Malik MD - Fellow Assisting  Anesthesia: General   Estimated blood loss: 30 mls  Drains: Pharyngostomy tube, Gastrojejunostomy tube  Specimens: None  Findings: Partial thickness esophageal laceration (33 cm - 41 cm from incisors.     Complications: None  Implants: None

## 2021-08-04 NOTE — ANESTHESIA PROCEDURE NOTES
Airway       Patient location during procedure: OR       Procedure Start/Stop Times: 8/4/2021 9:31 AM  Staff -        CRNA: Catarino Berry APRN CRNA       Performed By: CRNA  Consent for Airway        Urgency: elective  Indications and Patient Condition       Indications for airway management: johan-procedural       Induction type:RSI (cricoid pressure)       Mask difficulty assessment: 0 - not attempted    Final Airway Details       Final airway type: endotracheal airway       Successful airway: ETT - single  Endotracheal Airway Details        ETT size (mm): 8.0       Cuffed: yes       Successful intubation technique: direct laryngoscopy       DL Blade Type: Ceballos 2       Grade View of Cords: 1       Adjucts: stylet       Position: Right       Measured from: gums/teeth       Secured at (cm): 23    Post intubation assessment        Placement verified by: capnometry, equal breath sounds and chest rise        Number of attempts at approach: 1       Secured with: cloth tape       Ease of procedure: easy       Dentition: Intact

## 2021-08-04 NOTE — OR NURSING
"Patient came out of surgery very restless and trying to get out of bed. Patient was pulling on tubes and was restrained for his own safety. JUAN C Rosales came to bedside. Verbal given to give IV Haldol as ordered. Precedex given by CRNA at bedside.     Chest xray obtained, MD Malik at bedside. Okay'd central line placement for use. Per MD Malik keep chest tubes to water seal.     Patient continued to be restless, verbally abusive and confused. IV fentanyl given for pain as per MAR. Spoke with MDA Behrens, IV precedex ordered.     Thoracic resident first call paged at 1300: \" PACU 12: Adonay L: Patient unable to follow commands and is pulling on all lines. Patient is restrained and per JUAN C will be starting precedex drip. Patient will need to go to ICU with drip. Marleni VARGAS [phone] or [phone]\"    1315, no response from resident. MD Malik paged at 1315. \"PACU 12: Adonay L: Patient unable to follow commands and is pulling on all lines. Patient is restrained and per JUAN C will be starting precedex drip. Patient will need to go to ICU with drip. Marleni VARGAS [id number] or [phone]\"    At 1345, MD Howard paged (no response from previous thoracic team). Per JUAN C Howard, if 6D will not take with precedex gtt, send patient to ICU. Spoke with 6D charge, cannot take patient at this time.     Despite precedex, patient continues to thrash in bed and trying to get out. Patient repeatedly suddenly sitting up in bed.      Thoracic PA to bedside at 1350. Examined bilateral swollen chest tube sites. Neck site starting to bleed. Behind neck saturated. PA examined site and redressed site. Tube checked for positioning. STAT hemoglobin ordered.     MD Malik to bedside at 1355. Aware of situation. Okay if patient transfers to ICU. VORB to put in transfer order given. Thoracic will maintain primary.     Patient transferred to ICU.         "

## 2021-08-04 NOTE — ED PROVIDER NOTES
ED Provider Note  [facility]      History     Chief Complaint   Patient presents with     Pharyngitis     HPI  Janet Urias is a 61 year old male who past medical history of hypertension who presents to the emergency department as a transfer from outside hospital in [facility] for further evaluation of chest pain, back pain, and concerns for mediastinitis and esophageal rupture.  Patient reports that earlier today around 12 noon he was eating pork tracks and had difficulty swallowing, and felt as if something got stuck in his throat.  Patient reports he was unable to eat or drink throughout the day however reports that this is happened in the past and typically resolves.  Patient reports that last night around 8:30 PM he was drinking chocolate milk and also had a piece of toast.  Patient reports sudden onset of severe sharp of chest pain, back pain.  Patient reports pain is worse when lying flat and better when sitting up.  Patient denies any fever, chills.  Patient reports 1 episode of nausea and vomiting.  Denies any abdominal pain.  No other complaints.    Past Medical History  Past Medical History:   Diagnosis Date     Hypertension      Past Surgical History:   Procedure Laterality Date     ORTHOPEDIC SURGERY       lisinopril-hydrochlorothiazide (ZESTORETIC) 10-12.5 MG tablet      No Known Allergies  Family History  History reviewed. No pertinent family history.  Social History   Social History     Tobacco Use     Smoking status: Unknown If Ever Smoked   Substance Use Topics     Alcohol use: None     Drug use: Never      Past medical history, past surgical history, medications, allergies, family history, and social history were reviewed with the patient. No additional pertinent items.       Review of Systems   Constitutional: Negative for fever.   HENT: Negative for congestion.    Eyes: Negative for visual disturbance.   Respiratory: Negative for shortness of breath.   "  Cardiovascular: Positive for chest pain.   Gastrointestinal: Negative for abdominal pain, nausea and vomiting.   Endocrine: Negative for polyuria.   Genitourinary: Negative for dysuria.   Musculoskeletal: Positive for back pain.   Skin: Negative for rash.   Allergic/Immunologic: Negative for immunocompromised state.   Neurological: Negative for dizziness.   Hematological: Does not bruise/bleed easily.   Psychiatric/Behavioral: Negative for confusion.         Physical Exam   BP: (!) 134/99  Pulse: 115  Temp: 98.3  F (36.8  C)  Resp: 16  Height: 188 cm (6' 2\")  Weight: 104.3 kg (230 lb)  SpO2: 98 %  Physical Exam  General: Afebrile, moderate distress secondary to pain  HEENT: Normocephalic, atraumatic, conjunctivae normal. MMM  Neck: non-tender, supple  Cardio:  Tachycardic rate. regular rhythm   Resp: Normal work of breathing, no respiratory distress, lungs clear bilaterally, no wheezing, rhonchi, rales  Chest/Back: no visual signs of trauma, no CVA tenderness   Abdomen: soft, non distension, no tenderness, no peritoneal signs   Neuro: alert and fully oriented. CN II-XII grossly intact. Grossly normal strength and sensation in all extremities.   MSK: no deformities. Normal range of motion  Integumentary/Skin: no rash visualized, normal color  Psych: normal affect, normal behavior    ED Course      Procedures  No results found for any visits on 08/04/21.  Medications - No data to display      CRITICAL CARE: 45 minutes exclusive of procedures but including obtaining history, bedside examination, supervision of care, record review and collateral history from other parties, documentation, review/interpretation of imaging and lab results, discussion with patient, family, nursing, ancillary staff, consultants, and admitting service provider.   This patient presented with mediastinitis, esophageal rupture requiring immediate bedside evaluation and intervention to prevent sudden, clinically significant, or life threatening " deterioration in the patient's condition.      Assessments & Plan (with Medical Decision Making)   Janet Urias is a 61 year old male who past medical history of hypertension who presents to the emergency department as a transfer from outside hospital in [facility] for further evaluation of chest pain, back pain, and concerns for mediastinitis and esophageal rupture.  Upon arrival patient is well-appearing, afebrile, moderate distress secondary to pain.  Patient tachycardic with heart rate 114 bpm, blood pressure 134/99, oxygen 94% on room air.  Patient here with sudden onset of chest pain and back pain this evening, CT scan findings concerning for mass at the GE junction along with mediastinitis and possible esophageal rupture.  Patient received IV vancomycin, Zosyn prior to arrival along with IV Dilaudid.  Thoracic surgery fellow at the bedside, patient given a dose of IV fluconazole. Plan for transfer to OR for surgery followed by admission to Thoracic surgery.     I have reviewed the nursing notes. I have reviewed the findings, diagnosis, plan and need for follow up with the patient.    New Prescriptions    No medications on file       Final diagnoses:   Mediastinitis   Esophageal rupture       --  Edith Ramirez MD   [facility] EMERGENCY DEPARTMENT  8/4/2021     Edith Ramirez MD  08/04/21 0771

## 2021-08-04 NOTE — PHARMACY-VANCOMYCIN DOSING SERVICE
"Pharmacy Vancomycin Initial Note  Date of Service 2021  Patient's  1960  61 year old, male    Indication: Intra-abdominal infection    Current estimated CrCl = Estimated Creatinine Clearance: 103.8 mL/min (based on SCr of 0.95 mg/dL).    Creatinine for last 3 days  2021:  6:39 AM Creatinine 0.95 mg/dL    Recent Vancomycin Level(s) for last 3 days  No results found for requested labs within last 72 hours.      Vancomycin IV Administrations (past 72 hours)      No vancomycin orders with administrations in past 72 hours.                Nephrotoxins and other renal medications (From now, onward)    Start     Dose/Rate Route Frequency Ordered Stop    21 1600  piperacillin-tazobactam (ZOSYN) 3.375 g vial to attach to  mL bag     Note to Pharmacy: For [facility], [facility] and [facility]: For Zosyn-naive patients, use the \"Zosyn initial dose + extended infusion\" order panel.    3.375 g  over 30 Minutes Intravenous EVERY 6 HOURS 21 1244      21 1400  vancomycin 1250 mg in 0.9% NaCl 250 mL intermittent infusion 1,250 mg      1,250 mg  over 90 Minutes Intravenous EVERY 12 HOURS 21 1321            Contrast Orders - past 72 hours (72h ago, onward)    Start     Dose/Rate Route Frequency Ordered Stop    21 1140  iopamidol 76% (ISOVUE 370) + NaCl 0.9%  Status:  Discontinued        PRN 21 1205 21 1208        Loading dose: N/A  Regimen: 1250 mg IV every 12 hours.  Start time: 13:22 on 2021  Exposure target: AUC24 (range)400-600 mg/L.hr   AUC24,ss: 538 mg/L.hr  Probability of AUC24 > 400: 80 %  Ctrough,ss: 17.5 mg/L  Probability of Ctrough,ss > 20: 38 %  Probability of nephrotoxicity (Lodise FABIAN ): 13 %        Plan:  1. Start vancomycin  1250 mg IV q12h.   2. Vancomycin monitoring method: AUC  3. Vancomycin therapeutic monitoring goal: 400-600 mg*h/L  4. Pharmacy will check vancomycin levels as appropriate in 1-3 Days.    5. Serum creatinine levels will be ordered daily for " the first week of therapy and at least twice weekly for subsequent weeks.      Michelle Morris, PharmD Resident

## 2021-08-04 NOTE — ANESTHESIA CARE TRANSFER NOTE
Patient: Janet Urias    Procedure(s):  Esophagogastroduodenoscopy, GJ tube placement, pharyngoscopy, removal of foreign body, esophogram, and bilateral chest tube placement    Diagnosis: Esophageal perforation [K22.3]  Diagnosis Additional Information: No value filed.    Anesthesia Type:   General     Note:    Oropharynx: spontaneously breathing  Level of Consciousness: awake  Oxygen Supplementation: nasal cannula  Level of Supplemental Oxygen (L/min / FiO2): 4  Independent Airway: airway patency satisfactory and stable  Dentition: dentition unchanged  Vital Signs Stable: post-procedure vital signs reviewed and stable  Report to RN Given: handoff report given  Patient transferred to: PACU    Handoff Report: Identifed the Patient, Identified the Reponsible Provider, Reviewed the pertinent medical history, Discussed the surgical course, Reviewed Intra-OP anesthesia mangement and issues during anesthesia, Set expectations for post-procedure period and Allowed opportunity for questions and acknowledgement of understanding      Vitals:  Vitals Value Taken Time   /110 08/04/21 1229   Temp     Pulse 78 08/04/21 1233   Resp 14    SpO2 97 % 08/04/21 1233   Vitals shown include unvalidated device data.    Electronically Signed By: ELVIN Farr CRNA  August 4, 2021  12:34 PM

## 2021-08-04 NOTE — ED NOTES
ED Referral / Transfer note:      --  61YR Male patient - referred / transferred from [place], [place] for eval, treatment of FB sensation in throat since lunch, 8/3/21.  CT likely shows carcinoma, esophagitis.  Current patent airway.  Patient is neuro intact; A/O x 4.  WBC's elevated:  23.5.  Patient being dosed with 3.375G Zosyn, 40mg Protonix; dilaudid for pain, zofran for nausea.  18G x 2 (each AC).  Patient being flown to [facility] ED, via [facility].

## 2021-08-04 NOTE — PROGRESS NOTES
Admitted/transferred from: PACU  2 RN skin assessment: completed by: Zainab KOTHARI RN and Jacquelyn PEREZ RN  Results of skin assessment and interventions/actions:  Nothing of note other than tubes and drains.  Height, weight, drug calc weight: Done  Patient belongings (see Flowsheet): glasses and Phone with     ?

## 2021-08-04 NOTE — OP NOTE
Procedure Date: 08/04/2021    PREOPERATIVE DIAGNOSES:     1.  Possible esophageal perforation.  2.  Chronic dysphagia.    POSTOPERATIVE DIAGNOSES:    1.  Partial esophageal tear.  2.  Chronic dysphagia.    PROCEDURES PERFORMED:     1.  EGD.  2.  Intraoperative esophagram.  3.  Percutaneous endoscopic gastrojejunostomy tube placement.  4.  Bilateral thoracostomy tube placement.  5.  Supervision and interpretation of intraoperative imaging.  6.  Left neck pharyngostomy tube placement.  7.  EGD with extraction of food impaction.    ANESTHESIA:  General endotracheal anesthesia.    SURGEON:  Pool Hameed MD    ASSISTANT:  Abhi Malik MD, Cardiothoracic Surgery Fellow    ANESTHESIA:  General endotracheal anesthesia.    COMPLICATIONS:  None.    ESTIMATED BLOOD LOSS:  30 mL.    DRAINS AND TUBES:  1.  A 16-Chinese pharyngostomy tube sutured at 35 cm from the skin.  2.  Bilateral 28-Chinese straight [place] tubes to pleural spaces.  3.  An 18-Chinese gastrojejunostomy tube to gravity.    SPECIMENS:  None.       OPERATIVE FINDINGS:  EGD:  The patient had a moderately dilated esophagus.  There was a partial esophageal tear extending from 33-41 cm from the incisors on the left lateral part of the esophagus.  The GE junction was located at 43 cm from the esophagus.  The patient had moderate food impaction at the beginning of the case, with 3 large pieces of pork chop that were removed with a Sarabia net and alligator forceps.  Intraoperative esophagram revealed no extravasation of contrast.  The adult scope was able to be passed through the GE junction with no with minimal resistance.  We retroflexed in the stomach.  We did not find any masses at the cardia.  A gastrojejunostomy tube was placed as well as a pharyngostomy tube.    DESCRIPTION OF PROCEDURE IN DETAIL:  The patient was taken to the operating room, laid supine.  General anesthesia was induced.  He had a right IJ central line placed by Anesthesia as well as an  arterial line and Massey catheter.    After the line placement, a formal timeout was carried out, confirming the name of the patient and correct procedure.  He had SCDs in place and functioning prior to induction of anesthesia.  He received antibiotics within 30 minutes of incision.    After the timeout was completed, we started by introducing an adult scope into the esophagus and advanced into the mid esophagus.  At this level, we found that the patient had a moderate food impaction with large pieces of pork chop.  We then brought a Sarabia net to the field and used that to scoop out part of the food.  We then used an alligator forceps to remove the rest of the pork chop.  Once the esophageal food impaction was completely removed, we carefully inspected the mucosa of the esophagus.  The patient had a partial esophageal tear on the left lateral end of the esophagus extending from 33-41 cm from the incisors.  This was copiously washed out with saline.  We then performed an intraoperative esophagram, which revealed no evidence of extravasation.  We then advanced the scope through the GE junction, and we did not find any significant stricture stenosis at this level or mucosal lesions concerning for malignancy.  We retroflexed in the stomach, and again this appeared to be normal.  The patient did not appear to have a large hiatal hernia.  The rest of the stomach appeared normal.  At this point, we decided to treat the patient conservatively since hemodynamically he was doing very well, he was not on any pressors and the patient had a partial tear.    The scope was advanced into the stomach.  We insufflated and transilluminated.  We advanced the needle into the stomach and advanced a wire, which was grasped with a snare and pulled out through the mouth.  The snare was connected to a PEG tube, which was then pulled from the abdominal side, bringing the PEG tube through the abdominal wall.  We then cut the PEG tube, advanced  an Amplatz Super Stiff wire and then performed a 3-point gastropexy with 2-0 Vicryl.  Next, the PEG tube was removed and over the wire, a 20-Kuwaiti peel-away sheath was advanced into the stomach and through the pylorus.  The Amplatz Super Stiff wire was then navigated through the duodenum into the fourth portion of duodenum with minimal difficulty.  We then advanced over the wire the 18-Kuwaiti gastrojejunostomy tube.  The balloon was inflated with 7 mL and appeared to be inflated in the stomach.  The tube was then connected to gravity.    After the GJ tube was completed, we then advanced an Amplatz Super Stiff wire down the scope and left it in the stomach.  The scope was then withdrawn.  We then used a Ceballos blade to inspect the oropharynx.  With a Yankauer, we suctioned the mouth.  We identified the left posterior tonsillar pillar and using MD Lay, we pierced through the lateral wall of the pharynx.  A skin incision was made 1 cm inferior and lateral to the angle of the mandible on the left side.  The MD Lay was then pierced through this tract.  A 16-Kuwaiti NG tube was then grasped with the MD Lay and passed through the pharyngeal wall.  We then advanced the NG tube over the wire.  We removed the loop in the wire and then advanced under fluoroscopic guidance the pharyngostomy into the distal esophagus.  We removed the wire and sutured the pharyngostomy tube at 35 cm from the incisors.  The patient tolerated the procedure well.    All instrument and sponge counts were correct at the end of the case.  Of note, we placed bilateral pleural tubes at the beginning of the case using 28-Ku[place]i straight [place] tubes.  The tubes were connected to water seal.  There was no air leak from the tubes and the output was minimal at the end of the case.    The patient was then transferred to PACU after extubation without any problems.      Pool Hameed MD        D: 08/04/2021   T: 08/04/2021   MT:  [id number]    Name:     BRITTANEY MACIASÁngela  MRN:      [id number]        Account:        [id number]   :      1960           Procedure Date: 2021     Document: [id number]

## 2021-08-04 NOTE — ED TRIAGE NOTES
Arrival of patient via [facility] to room 11.  Patient finishing his vancomycin and IV-Fluids.  Patient received fentanyl en route for pain control.  Airway patent; neuro intact.

## 2021-08-04 NOTE — PLAN OF CARE
Major Shift Events:  A&Ox4, sleepy. HR SR 80s. BP stable to elevated. No PRN meds given. HTN resolved with pain meds. Afebrile. LS clear to diminished, Pt coughing and deep breathing regularly. CT to water seal, Rt with leak, left with minimal leak, Thoracic aware. G-tube to gravity, J tube with TF at 10mL/hr. Voiding with urinal. BS +, abdomen soft, not passing gas yet. Stood and pivoted at bedside 2 ast. A-line removed. Pharyngostomy with minimal drainage, no change in hematoma.  Thoracic aware.     Plan:  Transfer to , report given.     For vital signs and complete assessments, please see documentation flowsheets.

## 2021-08-04 NOTE — PROGRESS NOTES
"Post Op Check    08/04/2021    Janet Urias is a 61 year old year old male with h/o Esophageal laceration/perforation now POD#0 s/p Esophagogastroduodenoscopy, GJ tube placement, pharyngoscopy, removal of foreign body, esophogram, and bilateral chest tube placement.    Patient did have some emergence agitation that required sedation with dex and fentanyl in the PACU. He was subsequently transferred to Stepdown for further evaluation.     Pt reports feeling well with some mild persistent neck and incisional pain, well controlled on Tylenol and PRNs. Presently denies any F/C/S, CP, SOB, nausea, vomiting, diarrhea, lightheadedness or dizziness. No flatus or BM. Voiding appropriately  with sharp in place.    BP (!) 158/118   Pulse 85   Temp 97.9  F (36.6  C) (Oral)   Resp 18   Ht 1.88 m (6' 2\")   Wt 104.3 kg (230 lb)   SpO2 94%   BMI 29.53 kg/m    Body mass index is 29.53 kg/m .    Gen: A&O x3, NAD  Chest: breathing non-labored  Abdomen: soft, non-tender, non-distended  Incision: clean, dry, intact  Extremities: warm and well perfused  Right Armaan Chest tube: Water seal. Air leak present. Not tidaling.  Left Armaan Chest tube: Water seal, Tidaling. No obvious air leak.     A/P: No acute post-op issues. Continue plan of care per primary team.     Please page if questions,  Walker Corbett MD  Surgical Resident  #[id number]    "

## 2021-08-04 NOTE — ANESTHESIA PROCEDURE NOTES
Central Line/PA Catheter Placement  Pre-Procedure   Staff -        Anesthesiologist:  Behrens, Christopher J, MD       Performed By: anesthesiologist       Location: OR       Pre-Anesthestic Checklist: patient identified, IV checked, site marked, risks and benefits discussed, informed consent, monitors and equipment checked, pre-op evaluation, at physician/surgeon's request and post-op pain management  Timeout:       Correct Patient: Yes        Correct Procedure: Yes        Correct Site: Yes        Correct Position: Yes        Correct Laterality: Yes     Procedure   Procedure: central line       Laterality: right       Insertion Site: right, internal jugular.       Patient Position: Trendelenburg  Sterile Prep all elements of maximal sterile barrier technique not followed for medical reasons       Patient Prep/Sterile Barriers: hand hygiene, sterile gel and probe cover       Skin prep: Chloraprep  Insertion/Injection        Technique: Seldinger Technique        1. Ultrasound was used to evaluate the access site.       2. Vein evaluated via ultrasound for patency/adequacy.       3. Using real-time ultrasound the needle/catheter was observed entering the artery/vein.       5. The visualized structures were anatomically normal.       6. There were no apparent abnormal pathologic findings.       Catheter Type/Size: 7 Fr, 20 cm, 3-lumen.  Narrative         Secured by: suture       Tegaderm and Biopatch dressing used.       Complications: None apparent,        blood aspirated from all lumens,        All lumens flushed: Yes       Verification method: Placement to be verified post-op       Tip termination: right atrium

## 2021-08-04 NOTE — PHARMACY-ADMISSION MEDICATION HISTORY
Admission Medication History Completed by Pharmacy    See [facility] Admission Navigator for allergy information, preferred outpatient pharmacy, prior to admission medications and immunization status.     Medication History Sources:     CareEverywhere notes from annual physical on 7/21/21, Surescripts fill history.  A patient interview was not conducted at this time (patient is immediately post-op and full physical was less than 2 weeks ago- likely fully up to date)    Changes made to PTA medication list (reason):    Added: None    Deleted: None    Changed: None    Additional Information:    Per notes - patient has tried and failed 3 statins in the past (atorvastatin, pravastatin, and simvastatin).  Exact reaction / side effect unknown at this time.  These have been added as intolerances to current chart in [facility]    Prior to Admission medications    Medication Sig Last Dose Taking? Auth Provider   lisinopril-hydrochlorothiazide (ZESTORETIC) 10-12.5 MG tablet Take 1 tablet by mouth daily 8/3/2021 at am Yes Reported, Patient       Date completed: 08/04/21    Medication history completed by: Leonela Warren, PharmD, BCPS

## 2021-08-05 ENCOUNTER — APPOINTMENT (OUTPATIENT)
Dept: GENERAL RADIOLOGY | Facility: CLINIC | Age: 61
DRG: 327 | End: 2021-08-05
Attending: PHYSICIAN ASSISTANT
Payer: COMMERCIAL

## 2021-08-05 ENCOUNTER — APPOINTMENT (OUTPATIENT)
Dept: GENERAL RADIOLOGY | Facility: CLINIC | Age: 61
DRG: 327 | End: 2021-08-05
Payer: COMMERCIAL

## 2021-08-05 ENCOUNTER — APPOINTMENT (OUTPATIENT)
Dept: OCCUPATIONAL THERAPY | Facility: CLINIC | Age: 61
DRG: 327 | End: 2021-08-05
Payer: COMMERCIAL

## 2021-08-05 LAB
ANION GAP SERPL CALCULATED.3IONS-SCNC: 7 MMOL/L (ref 3–14)
BUN SERPL-MCNC: 20 MG/DL (ref 7–30)
CALCIUM SERPL-MCNC: 8.9 MG/DL (ref 8.5–10.1)
CHLORIDE BLD-SCNC: 109 MMOL/L (ref 94–109)
CO2 SERPL-SCNC: 27 MMOL/L (ref 20–32)
CREAT SERPL-MCNC: 0.92 MG/DL (ref 0.66–1.25)
ERYTHROCYTE [DISTWIDTH] IN BLOOD BY AUTOMATED COUNT: 13.7 % (ref 10–15)
GFR SERPL CREATININE-BSD FRML MDRD: 89 ML/MIN/1.73M2
GLUCOSE BLD-MCNC: 178 MG/DL (ref 70–99)
GLUCOSE BLDC GLUCOMTR-MCNC: 122 MG/DL (ref 70–99)
GLUCOSE BLDC GLUCOMTR-MCNC: 141 MG/DL (ref 70–99)
GLUCOSE BLDC GLUCOMTR-MCNC: 148 MG/DL (ref 70–99)
GLUCOSE BLDC GLUCOMTR-MCNC: 161 MG/DL (ref 70–99)
GLUCOSE BLDC GLUCOMTR-MCNC: 161 MG/DL (ref 70–99)
GLUCOSE BLDC GLUCOMTR-MCNC: 163 MG/DL (ref 70–99)
GLUCOSE BLDC GLUCOMTR-MCNC: 165 MG/DL (ref 70–99)
HCT VFR BLD AUTO: 47.7 % (ref 40–53)
HGB BLD-MCNC: 15.3 G/DL (ref 13.3–17.7)
MAGNESIUM SERPL-MCNC: 2.4 MG/DL (ref 1.6–2.3)
MCH RBC QN AUTO: 29.7 PG (ref 26.5–33)
MCHC RBC AUTO-ENTMCNC: 32.1 G/DL (ref 31.5–36.5)
MCV RBC AUTO: 93 FL (ref 78–100)
MRSA DNA SPEC QL NAA+PROBE: NEGATIVE
PHOSPHATE SERPL-MCNC: 2 MG/DL (ref 2.5–4.5)
PLATELET # BLD AUTO: 222 10E3/UL (ref 150–450)
POTASSIUM BLD-SCNC: 3.6 MMOL/L (ref 3.4–5.3)
RBC # BLD AUTO: 5.15 10E6/UL (ref 4.4–5.9)
SA TARGET DNA: POSITIVE
SODIUM SERPL-SCNC: 143 MMOL/L (ref 133–144)
WBC # BLD AUTO: 14 10E3/UL (ref 4–11)

## 2021-08-05 PROCEDURE — 87040 BLOOD CULTURE FOR BACTERIA: CPT

## 2021-08-05 PROCEDURE — 80048 BASIC METABOLIC PNL TOTAL CA: CPT | Performed by: STUDENT IN AN ORGANIZED HEALTH CARE EDUCATION/TRAINING PROGRAM

## 2021-08-05 PROCEDURE — 97530 THERAPEUTIC ACTIVITIES: CPT | Mod: GO

## 2021-08-05 PROCEDURE — 83735 ASSAY OF MAGNESIUM: CPT | Performed by: STUDENT IN AN ORGANIZED HEALTH CARE EDUCATION/TRAINING PROGRAM

## 2021-08-05 PROCEDURE — 999N000147 HC STATISTIC PT IP EVAL DEFER

## 2021-08-05 PROCEDURE — 84100 ASSAY OF PHOSPHORUS: CPT | Performed by: STUDENT IN AN ORGANIZED HEALTH CARE EDUCATION/TRAINING PROGRAM

## 2021-08-05 PROCEDURE — C9113 INJ PANTOPRAZOLE SODIUM, VIA: HCPCS | Performed by: STUDENT IN AN ORGANIZED HEALTH CARE EDUCATION/TRAINING PROGRAM

## 2021-08-05 PROCEDURE — 250N000013 HC RX MED GY IP 250 OP 250 PS 637: Performed by: STUDENT IN AN ORGANIZED HEALTH CARE EDUCATION/TRAINING PROGRAM

## 2021-08-05 PROCEDURE — 85027 COMPLETE CBC AUTOMATED: CPT | Performed by: STUDENT IN AN ORGANIZED HEALTH CARE EDUCATION/TRAINING PROGRAM

## 2021-08-05 PROCEDURE — 36592 COLLECT BLOOD FROM PICC: CPT | Performed by: STUDENT IN AN ORGANIZED HEALTH CARE EDUCATION/TRAINING PROGRAM

## 2021-08-05 PROCEDURE — 87641 MR-STAPH DNA AMP PROBE: CPT | Performed by: THORACIC SURGERY (CARDIOTHORACIC VASCULAR SURGERY)

## 2021-08-05 PROCEDURE — 250N000013 HC RX MED GY IP 250 OP 250 PS 637: Performed by: PHYSICIAN ASSISTANT

## 2021-08-05 PROCEDURE — 258N000003 HC RX IP 258 OP 636: Performed by: STUDENT IN AN ORGANIZED HEALTH CARE EDUCATION/TRAINING PROGRAM

## 2021-08-05 PROCEDURE — 71045 X-RAY EXAM CHEST 1 VIEW: CPT | Mod: 77

## 2021-08-05 PROCEDURE — 250N000011 HC RX IP 250 OP 636: Performed by: STUDENT IN AN ORGANIZED HEALTH CARE EDUCATION/TRAINING PROGRAM

## 2021-08-05 PROCEDURE — 71045 X-RAY EXAM CHEST 1 VIEW: CPT | Mod: 26 | Performed by: RADIOLOGY

## 2021-08-05 PROCEDURE — 97535 SELF CARE MNGMENT TRAINING: CPT | Mod: GO

## 2021-08-05 PROCEDURE — 250N000009 HC RX 250

## 2021-08-05 PROCEDURE — 97165 OT EVAL LOW COMPLEX 30 MIN: CPT | Mod: GO

## 2021-08-05 PROCEDURE — 36592 COLLECT BLOOD FROM PICC: CPT

## 2021-08-05 PROCEDURE — 250N000009 HC RX 250: Performed by: PHYSICIAN ASSISTANT

## 2021-08-05 PROCEDURE — 71045 X-RAY EXAM CHEST 1 VIEW: CPT

## 2021-08-05 PROCEDURE — 120N000002 HC R&B MED SURG/OB UMMC

## 2021-08-05 RX ORDER — PANTOPRAZOLE SODIUM 40 MG/1
40 TABLET, DELAYED RELEASE ORAL
Status: DISCONTINUED | OUTPATIENT
Start: 2021-08-06 | End: 2021-08-10 | Stop reason: HOSPADM

## 2021-08-05 RX ORDER — BACITRACIN ZINC 500 [USP'U]/G
OINTMENT TOPICAL 2 TIMES DAILY
Status: DISCONTINUED | OUTPATIENT
Start: 2021-08-05 | End: 2021-08-10 | Stop reason: HOSPADM

## 2021-08-05 RX ORDER — OXYCODONE HCL 5 MG/5 ML
5-10 SOLUTION, ORAL ORAL EVERY 4 HOURS PRN
Status: DISCONTINUED | OUTPATIENT
Start: 2021-08-05 | End: 2021-08-10 | Stop reason: HOSPADM

## 2021-08-05 RX ORDER — LABETALOL HYDROCHLORIDE 5 MG/ML
20 INJECTION, SOLUTION INTRAVENOUS EVERY 6 HOURS PRN
Status: DISCONTINUED | OUTPATIENT
Start: 2021-08-05 | End: 2021-08-10 | Stop reason: HOSPADM

## 2021-08-05 RX ORDER — CHLORHEXIDINE GLUCONATE ORAL RINSE 1.2 MG/ML
15 SOLUTION DENTAL 2 TIMES DAILY
Status: DISCONTINUED | OUTPATIENT
Start: 2021-08-05 | End: 2021-08-10 | Stop reason: HOSPADM

## 2021-08-05 RX ORDER — MAGNESIUM HYDROXIDE 1200 MG/15ML
LIQUID ORAL
Status: COMPLETED
Start: 2021-08-05 | End: 2021-08-05

## 2021-08-05 RX ADMIN — VANCOMYCIN HYDROCHLORIDE 1250 MG: 100 INJECTION, POWDER, LYOPHILIZED, FOR SOLUTION INTRAVENOUS at 02:11

## 2021-08-05 RX ADMIN — PIPERACILLIN SODIUM AND TAZOBACTAM SODIUM 3.38 G: 3; .375 INJECTION, POWDER, LYOPHILIZED, FOR SOLUTION INTRAVENOUS at 18:30

## 2021-08-05 RX ADMIN — ACETAMINOPHEN 650 MG: 325 SOLUTION ORAL at 18:30

## 2021-08-05 RX ADMIN — OXYCODONE HYDROCHLORIDE 5 MG: 5 SOLUTION ORAL at 18:30

## 2021-08-05 RX ADMIN — ACETAMINOPHEN 650 MG: 325 SOLUTION ORAL at 09:51

## 2021-08-05 RX ADMIN — ACETAMINOPHEN 650 MG: 325 SOLUTION ORAL at 14:01

## 2021-08-05 RX ADMIN — BACITRACIN ZINC: 500 OINTMENT TOPICAL at 22:01

## 2021-08-05 RX ADMIN — HYDROMORPHONE HYDROCHLORIDE 0.4 MG: 0.2 INJECTION, SOLUTION INTRAMUSCULAR; INTRAVENOUS; SUBCUTANEOUS at 05:10

## 2021-08-05 RX ADMIN — FLUCONAZOLE IN SODIUM CHLORIDE 200 MG: 2 INJECTION, SOLUTION INTRAVENOUS at 16:01

## 2021-08-05 RX ADMIN — OXYCODONE HYDROCHLORIDE 5 MG: 5 SOLUTION ORAL at 09:51

## 2021-08-05 RX ADMIN — HYDROMORPHONE HYDROCHLORIDE 0.4 MG: 0.2 INJECTION, SOLUTION INTRAMUSCULAR; INTRAVENOUS; SUBCUTANEOUS at 00:31

## 2021-08-05 RX ADMIN — VANCOMYCIN HYDROCHLORIDE 1250 MG: 100 INJECTION, POWDER, LYOPHILIZED, FOR SOLUTION INTRAVENOUS at 13:45

## 2021-08-05 RX ADMIN — ACETAMINOPHEN 650 MG: 325 SOLUTION ORAL at 22:35

## 2021-08-05 RX ADMIN — PIPERACILLIN SODIUM AND TAZOBACTAM SODIUM 3.38 G: 3; .375 INJECTION, POWDER, LYOPHILIZED, FOR SOLUTION INTRAVENOUS at 06:13

## 2021-08-05 RX ADMIN — ENOXAPARIN SODIUM 40 MG: 100 INJECTION SUBCUTANEOUS at 06:13

## 2021-08-05 RX ADMIN — OXYCODONE HYDROCHLORIDE 10 MG: 5 SOLUTION ORAL at 22:35

## 2021-08-05 RX ADMIN — POTASSIUM CHLORIDE, DEXTROSE MONOHYDRATE AND SODIUM CHLORIDE: 150; 5; 450 INJECTION, SOLUTION INTRAVENOUS at 01:40

## 2021-08-05 RX ADMIN — OXYCODONE HYDROCHLORIDE 10 MG: 5 SOLUTION ORAL at 14:01

## 2021-08-05 RX ADMIN — POTASSIUM CHLORIDE, DEXTROSE MONOHYDRATE AND SODIUM CHLORIDE: 150; 5; 450 INJECTION, SOLUTION INTRAVENOUS at 12:01

## 2021-08-05 RX ADMIN — PANTOPRAZOLE SODIUM 40 MG: 40 INJECTION, POWDER, FOR SOLUTION INTRAVENOUS at 08:18

## 2021-08-05 RX ADMIN — PIPERACILLIN SODIUM AND TAZOBACTAM SODIUM 3.38 G: 3; .375 INJECTION, POWDER, LYOPHILIZED, FOR SOLUTION INTRAVENOUS at 00:24

## 2021-08-05 RX ADMIN — CHLORHEXIDINE GLUCONATE 0.12% ORAL RINSE 15 ML: 1.2 LIQUID ORAL at 20:49

## 2021-08-05 RX ADMIN — PIPERACILLIN SODIUM AND TAZOBACTAM SODIUM 3.38 G: 3; .375 INJECTION, POWDER, LYOPHILIZED, FOR SOLUTION INTRAVENOUS at 12:15

## 2021-08-05 RX ADMIN — SODIUM CHLORIDE 30 ML: 900 IRRIGANT IRRIGATION at 21:02

## 2021-08-05 RX ADMIN — POLYETHYLENE GLYCOL 3350 17 G: 17 POWDER, FOR SOLUTION ORAL at 12:15

## 2021-08-05 ASSESSMENT — MIFFLIN-ST. JEOR: SCORE: 1884.75

## 2021-08-05 ASSESSMENT — ACTIVITIES OF DAILY LIVING (ADL)
ADLS_ACUITY_SCORE: 19
IADL_COMMENTS: IND
ADLS_ACUITY_SCORE: 19
PREVIOUS_RESPONSIBILITIES: MEAL PREP;HOUSEKEEPING;LAUNDRY;SHOPPING;YARDWORK;MEDICATION MANAGEMENT;FINANCES;DRIVING;SCHOOL

## 2021-08-05 NOTE — PROGRESS NOTES
Antimicrobial Stewardship Team Note    Antimicrobial Stewardship Program - A joint venture between [facility] and  Physicians to optimize antibiotic management.  NOT a formal consult - Restricted Antimicrobial Review     Patient: Janet Urias  MRN: [id number]  Allergies: Atorvastatin, Pravastatin, and Simvastatin    Brief Summary: Janet Urias is a 61 year old male with a PMH significant of hypertension and chronic dysphagia who presents to the emergency department as a transfer from outside hospital in [place], [place] for further evaluation of chest pain, back pain, and concerns for mediastinitis and esophageal rupture.    HPI: Upon arrival patient was afebrile with moderate distress secondary to pain. Patient was tachycardic with heart rate 114 bpm, blood pressure 134/99, WBC of 23 and oxygen 94% on room air.  Patient here with sudden onset of chest pain and back pain, CT scan findings concerning for mass at the GE junction along with mediastinitis and possible esophageal rupture.  Patient was started on vanco and zosyn prior to arrival and fluconazole was added on arrival. Patient underwent  upper GI endoscopy, pharynostomy placement, GJ placement and bilateral chest tube placement for esophageal laceration on 8/4. Today, patient is hypertensive at 148/92, WBC down from 16.2 to 14, afebrile, and stable VS. No cultures pending.          Active Anti-infective Medications   (From admission, onward)                 Start     Stop    08/04/21 1600  piperacillin-tazobactam  3.375 g,   Intravenous,   EVERY 6 HOURS     Intra-Abdominal Infection        --    08/04/21 1400  vancomycin 1250 mg  1,250 mg,   Intravenous,   EVERY 12 HOURS     Intra-Abdominal Infection        --    08/04/21 1300  fluconazole  200 mg,   Intravenous,   100 mL/hr,   EVERY 24 HOURS     Esophageal perforation        --                  Assessment: Esophageal laceration  As currently presented, the patient is otherwise stable  hemodynamically, has no active infection and WBC are down trending. Additionally, there is no imaging suggests an infection currently. However, esophogeal lacerations are considered surgical emergencies and require extensive post operative management. Broad-spectrum intravenous antibiotics such as Zosyn that provide coverage for aerobes and anaerobes are indicated as prophylaxis in order to prevent infection.  Additionally, antifungal coverage is indicated in select patients including those patients with chronic obstructive motility pathology with stasis in the esophagus. Our patient has a PMH of chronic dysphagia and thus fluconazole is indicated.  At this time, the patient has no indication for empiric MRSA coverage, such as prior history of MRSA infection or colonization. May consider MRSA nares swab to assess colonization. Recommend stopping vancomycin at this time. Duration of therapy for an esophageal laceration is typically 7 to 10 days depending on the patient's clinical picture and esophogeal laceration wound improvement.     Recommendations:  Stop vanco   Continue Zosyn and fluconazole    Discussed with ID Staff: Steve Dunn MD, M.Med.Sc. and Debra Randolph, PharmD, BCIDP    Darek Paez IV Pharm.D Student  Pager: [phone]      Vital Signs/Clinical Features:  Vitals         08/03 0700  -  08/04 0659 08/04 0700  -  08/05 0659 08/05 0700  -  08/05 1517   Most Recent    Temp ( F)   98.3    94.1 -  99    98.1 -  98.4     98.1 (36.7)    Pulse 112 -  115    75 -  108      90     90    Resp   16    14 -  18      18     18    BP   134/99    107/60 -  166/136    148/92 -  153/92     148/92    SpO2 (%) 87 -  98    93 -  98    94 -  95     94            Labs  Estimated Creatinine Clearance: 105.9 mL/min (based on SCr of 0.92 mg/dL).  Recent Labs   Lab Test 08/04/21  0639 08/04/21  1630 08/05/21  1020   CR 0.95 0.97 0.92       Recent Labs   Lab Test 08/04/21  0639 08/04/21  1355 08/04/21  1630  08/05/21  1020   WBC 20.3*  --  16.2* 14.0*   HGB 17.0 13.2* 15.9 15.3   HCT 49.8  --  47.6 47.7   MCV 89  --  90 93     --  250 222       Recent Labs   Lab Test 08/04/21  0639   BILITOTAL 0.9   ALKPHOS 44   ALBUMIN 4.0   AST 13   ALT 26                   Culture Results:  7-Day Micro Results       Procedure Component Value Units Date/Time    MRSA MSSA PCR, Nasal Swab     Order Status: Sent Lab Status: No result     Specimen: Swab     Asymptomatic COVID-19 Virus (Coronavirus) by PCR Nasopharyngeal [[id number]]  (Normal) Collected: 08/04/21 0727    Order Status: Completed Lab Status: Final result Updated: 08/04/21 0851    Specimen: Swab from Nasopharyngeal     Narrative:      The following orders were created for panel order Asymptomatic COVID-19 Virus (Coronavirus) by PCR Nasopharyngeal.  Procedure                               Abnormality         Status                     ---------                               -----------         ------                     SARS-COV2 (COVID-19) Vir...[[id number]]  Normal              Final result                 Please view results for these tests on the individual orders.                                    Imaging: XR Chest Port 1 View    Result Date: 8/5/2021  RESIDENT PRELIMINARY INTERPRETATION IMPRESSION: 1. Interval removal of right chest tube. No definite pneumothorax. 2. Stable streaky perihilar and bibasilar opacities, likely consistent with atelectasis.    XR Chest Port 1 View    Result Date: 8/5/2021  EXAM: XR CHEST PORT 1 VIEW  8/5/2021 6:30 AM  HISTORY: Interval Chest tube COMPARISON: 8/4/2021 FINDINGS: Single view of the chest. Enteric tube distal tip and sidehole overlie the esophagus. Right IJ central venous catheter distal tip near the the high SVC. Stable position of bilateral chest tubes. Trachea is midline. Stable size of the cardiomediastinal silhouette. Low inspiratory lung volumes. Slightly decreased perihilar and bibasilar opacities. No new focal  opacity. No definite pneumothorax.     IMPRESSION: 1. Stable position bilateral chest tubes. 2. Decreased perihilar and bibasilar opacities. I have personally reviewed the examination and initial interpretation and I agree with the findings. MARCIA ZUNIGA MD   SYSTEM ID:  [id number]    XR Chest Port 1 View    Result Date: 8/4/2021  Exam: XR CHEST PORT 1 VIEW, 8/4/2021 12:35 PM Indication: Esophageal laceration s/p bilateral chest tubes, EGD, pharyngostomy, G-J tube Comparison: Same-day CT Findings: Frontal view chest x-ray. Right IJ central venous catheter distal tip projects over the mid superior vena cava. Enteric tube distal tip projects over the distal esophagus. Contrast in the stomach. Bilateral chest tubes in place. No appreciable pneumothorax. Cardiac silhouette is enlarged. Hazy perihilar and bibasilar airspace opacities. No pleural effusion. There is air under the right hemidiaphragm. No acute osseous abnormalities.     Impression: 1. Hazy perihilar and bibasilar airspace opacities may represent atelectasis versus pulmonary edema. 2. Enteric tube distal tip projects over the distal esophagus. 3. Bilateral chest tubes in place. No appreciable pneumothorax. 4. Air under the right hemidiaphragm, nonspecific in the setting of recent surgery. I have personally reviewed the examination and initial interpretation and I agree with the findings. TJ DURBIN MD   SYSTEM ID:  [id number]    XR Surgery JACKLYN Fluoro L/T 5 Min w Stills    Result Date: 8/5/2021  This exam was marked as non-reportable because it will not be read by a radiologist or a [place] non-radiologist provider.

## 2021-08-05 NOTE — PLAN OF CARE
Temp: 98.4  F (36.9  C) Temp src: Oral BP: (!) 155/98 Pulse: 84   Resp: 16 SpO2: 94 % O2 Device: None (Room air)       Neuro: A&Ox4. Can make needs known.   Cardiac: SR, HR 80s. VSS, slightly hypertensive-team aware. Afebrile   Respiratory: Sating >95% on RA.  GI/: Adequate urine output via urinal. No BM this shift, not passing flatus-miralax given. Denies nausea  Diet/appetite: Strict NPO. Mouth swabs ok and pt independently suctions PO. TF at 55mL/hr, goal 70mL. FWF 60mL q4h. BS q4h with sliding scale insulin   Activity:  Assist of standby, up to chair and in halls.  Pain: At acceptable level on current regimen. Shoulder and back pain-PRN oxy and tylenol given x2  Skin: No new deficits noted.   LDA's: L CT to waterseal  Pharyngostomy tube   R PIV X2  L PIV  GJ tube- G to gravity, J for tube feed and meds  L TL IJ    Plan: Continue with POC. Notify primary team with changes.

## 2021-08-05 NOTE — PROGRESS NOTES
08/05/21 1300   Quick Adds   Type of Visit Initial Occupational Therapy Evaluation   Living Environment   People in home spouse   Current Living Arrangements house   Home Accessibility stairs to enter home;stairs within home   Number of Stairs, Main Entrance greater than 10 stairs   Number of Stairs, Within Home, Primary greater than 10 stairs   Stair Railings, Within Home, Primary railings safe and in good condition   Transportation Anticipated car, drives self;family or friend will provide   Living Environment Comments Pt lives in a house with his wife, reports there are at least 10-15 stairs to enter the home then multiple levels within the home, pt states he can stay on the main level if needed.   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    Instrumental Activities of Daily Living (IADL)   Previous Responsibilities meal prep;housekeeping;laundry;shopping;yardwork;medication management;finances;driving;school   IADL Comments IND   Disability/Function   Hearing Difficulty or Deaf no   Wear Glasses or Blind yes   Vision Management glasses   Concentrating, Remembering or Making Decisions Difficulty no   Difficulty Communicating no   Difficulty Eating/Swallowing no   Walking or Climbing Stairs Difficulty no   Dressing/Bathing Difficulty no   Toileting issues no   Doing Errands Independently Difficulty (such as shopping) no   Fall history within last six months no   Change in Functional Status Since Onset of Current Illness/Injury yes   General Information   Onset of Illness/Injury or Date of Surgery 08/04/21   Referring Physician Abhi Malik MD   Patient/Family  Therapy Goal Statement (OT) return home   Additional Occupational Profile Info/Pertinent History of Current Problem Per chart: POD 1 from EGD, pharynostomy placement, GJ placement, bilateral chest tube placement for esophageal laceration. Post-op was agitated requiring precedex gtt however now doing well, no longer requiring precedex. Having some pain and difficulty swallowing this morning. Otherwise doing okay. Tolerating trickle feeds   Performance Patterns (Routines, Roles, Habits) Pt is IND with all mobility and ADLs/IADLs at baseline.    Existing Precautions/Restrictions thoracotomy   Left Upper Extremity (Weight-bearing Status) full weight-bearing (FWB)   Right Upper Extremity (Weight-bearing Status) full weight-bearing (FWB)   Left Lower Extremity (Weight-bearing Status) full weight-bearing (FWB)   Right Lower Extremity (Weight-bearing Status) full weight-bearing (FWB)   General Observations and Info Activity: ambulate with assist   Cognitive Status Examination   Orientation Status orientation to person, place and time   Affect/Mental Status (Cognitive) WNL   Follows Commands WNL   Cognitive Status Comments No cognition deficits noted.    Sensory   Sensory Quick Adds No deficits were identified   Pain Assessment   Patient Currently in Pain Yes, see Vital Sign flowsheet   Integumentary/Edema   Integumentary/Edema no deficits were identifed   Posture   Posture not impaired   Range of Motion Comprehensive   General Range of Motion no range of motion deficits identified   Strength Comprehensive (MMT)   General Manual Muscle Testing (MMT) Assessment no strength deficits identified   Coordination   Upper Extremity Coordination No deficits were identified   Gross Motor Coordination No deficits identified   Fine Motor Coordination WNL    Transfers   Transfer Comments SBA sit<>stand    Balance   Balance Comments No loss of balance noted with ambulation.    Clinical Impression   Criteria for Skilled Therapeutic  Interventions Met (OT) yes;skilled treatment is necessary   OT Diagnosis Decreased ADL/IADL-I   OT Problem List-Impairments impacting ADL problems related to;activity tolerance impaired;pain;post-surgical precautions;strength   Assessment of Occupational Performance 3-5 Performance Deficits   Identified Performance Deficits home management, work, community mobility, dressing, bathing   Planned Therapy Interventions (OT) ADL retraining;IADL retraining;progressive activity/exercise;strengthening;transfer training   Clinical Decision Making Complexity (OT) low complexity   Therapy Frequency (OT) Daily   Predicted Duration of Therapy 1 week   Risk & Benefits of therapy have been explained evaluation/treatment results reviewed;care plan/treatment goals reviewed;risks/benefits reviewed;current/potential barriers reviewed;participants voiced agreement with care plan;participants included;patient   OT Discharge Planning    OT Discharge Recommendation (DC Rec) Home with assist   OT Rationale for DC Rec Anticipate with improved pain control and progressive activity pt will be safe to return home with assist from wife as needed for heavier IADLs once medically stable.    OT Brief overview of current status  SBA    Total Evaluation Time (Minutes)   Total Evaluation Time (Minutes) 4

## 2021-08-05 NOTE — PROVIDER NOTIFICATION
Aric paged regarding positive blood cultures from [facility]- gram+ cocci in chains

## 2021-08-05 NOTE — PROGRESS NOTES
Thoracic Surgery Progress Note  08/05/2021       Subjective:  POD 1 from EGD, pharynostomy placement, GJ placement, bilateral chest tube placement for esophageal laceration. Post-op was agitated requiring precedex gtt however now doing well, no longer requiring precedex. Having some pain and difficulty swallowing this morning. Otherwise doing okay. Tolerating trickle feeds     Objective:  Temp:  [94.1  F (34.5  C)-99  F (37.2  C)] 98.4  F (36.9  C)  Pulse:  [75-97] 88  Resp:  [14-18] 18  BP: (107-166)/() 153/92  MAP:  [87 mmHg-150 mmHg] 113 mmHg  Arterial Line BP: (114-184)/() 156/89  SpO2:  [93 %-97 %] 95 %    I/O last 3 completed shifts:  In: 2026.67 [I.V.:1756.67; NG/GT:140]  Out: 2082 [Urine:1760; Emesis/NG output:75; Drains:50; Blood:30; Chest Tube:167]      Gen: Awake, alert, NAD  Resp: NLB on NC  Chest: bilateral chest tubes with minimal serosanguinous output, no air leak  Abd: soft, nondistended, nontender  Incision: c/d/I  Ext: WWP, no edema     Labs:  Recent Labs   Lab 08/04/21  1630 08/04/21  1355 08/04/21  0639   WBC 16.2*  --  20.3*   HGB 15.9 13.2* 17.0     --  247       Recent Labs   Lab 08/05/21  0746 08/05/21  0629 08/05/21  0217 08/04/21  1630 08/04/21  0639   NA  --   --   --  142 141   POTASSIUM  --   --   --  3.8 3.7   CHLORIDE  --   --   --  110* 110*   CO2  --   --   --  29 24   BUN  --   --   --  24 24   CR  --   --   --  0.97 0.95   * 163* 165* 198* 232*   ANA  --   --   --  8.6 9.0       Imaging:  CXR - decreased bilateral opacities, maybe small apical ptx on left     Assessment/Plan:   61 year old male with concern for esophageal perforation now s/p EGD, GJ placement, pharyngostomy tube placement and foreign body removal. Found to have long, vertical esophageal laceration without concern for perforation on on-table esophagram. Post-operatively issues with agitation requiring precedex gtt now resolved. Doing well.    - Continue tylenol, oxycodone via J-tube  -  Continue bilateral chest tubes to water seal  - AM CXR  - Strict NPO, pharyngostomy tube to LIS  - G tube to gravity, J tube for feeds and meds  - Continue trickle feeds, will discuss advancement  - Monitor urine output, mIVF 100 mL/hr  - Continue broad spectrum antibiotics for now: vanc/zosyn/fluconazole (8/4-)  - PT/OT, OOB, ambulate  - Lovenox for DVT ppx     Seen, examined, and discussed with fellow who will discuss with staff  - - - - - - - - - - - - - - - - - -  Jerry Medina MD  PGY-3, General Surgery    Addendum:  - Place rate to 40 ml/hr NOW, advance to 55 ml/hr at 1500, then advance to goal of 70 ml/hr at 1800  - Will pull right chest tube  - Will pull CVC  - Okay for transfer to

## 2021-08-05 NOTE — PLAN OF CARE
PT: DEFER. Per OT pt is moving well POD 1, limited mostly by pain today. Anticipate will be appropriate to be followed by one discipline only while inpatient. Please defer to OT notes for discharge recs. Will complete orders.

## 2021-08-05 NOTE — PLAN OF CARE
"BP (!) 147/95 (BP Location: Right arm)   Pulse 88   Temp 98.8  F (37.1  C) (Oral)   Resp 16   Ht 1.88 m (6' 2\")   Wt 101.5 kg (223 lb 12.3 oz)   SpO2 96%   BMI 28.73 kg/m    Neuro: A&Ox4. Calm and cooperative.  Cardiac: SR. VSS.  Respiratory: Sating 90s on RA.   GI/: Adequate urine output. No BM this shift.  Diet/appetite: Tolerating NPO/continuous TF diet. TF at current goal rate of 10ml/hr into J-tube. Meds in J-tube.  Activity:  SBA with activity d/t lines/tubes.   Pain: Given x2 PRN 0.4mg IV dilaudid for pain  Skin: x2 CT sites, L neck site, G/J-tube site.   LDA's: x2 chest tubes (water seal), L neck pharyngostomy, G/J tube (G to gravity, J for Tf/meds), R x3 lumen internal jugular line, x3 PIV    D5 1/2 NS 20meq K+ running at 100ml/hr, NS TKO for abx. No acute events this shift, pt seeking pain control for some sleep.      Plan: Continue with POC. Notify primary team with changes.    "

## 2021-08-05 NOTE — PROGRESS NOTES
"CLINICAL NUTRITION SERVICES - ASSESSMENT NOTE     Nutrition Prescription    RECOMMENDATIONS FOR MDs/PROVIDERS TO ORDER:  1. Recommendations for TF advancement below.   2. Recommend stopping D5 with TF advancement.   3. For RD to order TFs please place order \"Registered Dietitian to Assess and Order TF per Medical Nutrition Therapy Protocol\"    Malnutrition Status:    Patient does not meet two of the established criteria necessary for diagnosing malnutrition    Recommendations already ordered by Registered Dietitian (RD):  Ordered add on magnesium and phosphorus labs    Future/Additional Recommendations:  TF advancement recommendations:   Jevity 1.5 Segundo @ goal of  70ml/hr  (1680ml/day)  will provide: 2520 kcals, 107 g PRO, 1276 ml free H20, 362 g CHO, and 35 g fiber daily. + 2 pkts ProSource.  Total Provisions: 2600 kcal (25 kcal/kg) and 129 g PRO (1.3 g/kg).   - Initiate @ 10 ml/hr and advance by 10 ml q8hr pending pt's tolerance  - Do not start or advance TF rate unless Mg++ >1.5, K+ is >3, and phos >1.9  - Recommend 60 ml q4hr fluid flushes for tube patency. Additional fluids and/or adjustments per MD.    - Order multivitamin/mineral (15 ml/day via FT) to help ensure micronutrient needs being met with suspected hypermetabolic demands and potential interruptions to TF infusions.     REASON FOR ASSESSMENT  Janet Urias is a/an 61 year old male assessed by the dietitian for Provider Order - Tube Feedings     Clinical History: Esophageal laceration/perforation now s/p Esophagogastroduodenoscopy, GJ tube placement, pharyngoscopy, removal of foreign body, esophogram, and bilateral chest tube placement.    NUTRITION HISTORY  Pt reports that his appetite was good PTA. He was eating 3 meals a day and occasional snacks.     CURRENT NUTRITION ORDERS  Diet: NPO  Nutrition Support: Jevity 1.5 @ 10 mL/hr via J-tube  Intake/Tolerance: Pt reports no nausea or discomfort with TF start.     LABS  Labs reviewed  BG 8/4-8/5: " "161-232  K+ 3.8 (WNL)    MEDICATIONS  Medications reviewed  Novolog  Protonix  Miralax  D5 @ 100 mL/hr    ANTHROPOMETRICS  Height: 188 cm (6' 2\")  Most Recent Weight: 101.5 kg (223 lb 12.3 oz)    IBW: 86.4 kg (117%)   BMI: Overweight BMI 25-29.9  Weight History: Weight loss of 6.5 kg (6%) over the past 4 months, does not meet criteria for malnutrition.   102.8 kg (226 lb 9.6 oz) 07/27/2021     108 kg (238 lb) 04/14/2021      Dosing Weight: 102 kg (admit wt)     ASSESSED NUTRITION NEEDS  Estimated Energy Needs: 7303-4549 kcals/day (25 - 30 kcals/kg)  Justification: Maintenance  Estimated Protein Needs: 120-155 grams protein/day (1.2 - 1.5 grams of pro/kg)  Justification: Hypercatabolism with acute illness  Estimated Fluid Needs: 1 mL/kcal  Justification: Maintenance    PHYSICAL FINDINGS  See malnutrition section below.    MALNUTRITION  % Intake: No decreased intake noted  % Weight Loss: None noted  Subcutaneous Fat Loss: None observed  Muscle Loss: None observed  Fluid Accumulation/Edema: None noted  Malnutrition Diagnosis: Patient does not meet two of the established criteria necessary for diagnosing malnutrition    NUTRITION DIAGNOSIS  Inadequate oral intake related to NPO status 2/2 esophageal laceration/perforation and pharynostomy placement as evidenced by J-tube placement and TF start       INTERVENTIONS  Implementation  Nutrition Education: Discussed PO intake PTA and enteral nutrition.    Enteral Nutrition - Recommendations above    Collaboration with other Providers: Paged provider regarding recommendations for TF advancement.     Goals  Total avg nutritional intake to meet a minimum of 25 kcal/kg and 1.2 g PRO/kg daily (per dosing wt 102 kg).     Monitoring/Evaluation  Progress toward goals will be monitored and evaluated per protocol.    Debbie Garcia, RD, LD  6B RD pager [phone]      "

## 2021-08-05 NOTE — PLAN OF CARE
Neuro: A&Ox4.   Cardiac: SR. HR 80-90s VSS.   Respiratory: Sating mid 90s on RA.  GI/: Adequate urine output. No BM  Diet/appetite: Strict NPO. Continuous TF via J-tube 10 ml/hr, G-tube to gravity  Activity:  Assist of 1 for line management, up to chair and in halls.  Pain: Pain is currently controlled with lidocaine patch, ice pack and dilaudid and tylenol.  Skin: No new deficits noted.  LDA's: R internal jugular-D5 1/2 NS + KCl 20mEq @ 100 ml/hr, tko and blue lumen saline locked, L PIV-saline locked, 2 R PIVs- saline locked, J-tube TF @ 10 ml/hr, g-tube to gravity, L & R CT to water seal and pharyngostomy to LIS.     Plan: Continue with POC. Notify primary team with changes.

## 2021-08-05 NOTE — PROGRESS NOTES
Transfer  Transferred from: 4A  Via:bed  Reason for transfer: Pt appropriate for 6B- improved patient condition  Family: Aware of transfer  Belongings: Received with pt  Chart: Received with pt  Medications: Meds received from old unit with pt  Code Status verified on armband: Thoracic team paged to place a code status. None currently on file.   2 RN Skin Assessment Completed By: Not yet completed, passed onto oncoming nurse.   Med rec completed: yes  Bed surface reassessed with algorithm and charted: yes  New bed surface ordered: no  Suction/Ambu bag/Flowmeter at bedside: yes    Report received from: Jacquelyn VARGAS  Pt status: VSS on RA. Oriented to room and call light. Pharyngostomy tube to LIS. Gtube to gravity. TF via Jtube @ 10 mL/hr. MIVF @100 mL/hr. Up with SBA to chair at bedside.

## 2021-08-06 ENCOUNTER — APPOINTMENT (OUTPATIENT)
Dept: GENERAL RADIOLOGY | Facility: CLINIC | Age: 61
DRG: 327 | End: 2021-08-06
Payer: COMMERCIAL

## 2021-08-06 ENCOUNTER — HOME INFUSION (PRE-WILLOW HOME INFUSION) (OUTPATIENT)
Dept: PHARMACY | Facility: CLINIC | Age: 61
End: 2021-08-06

## 2021-08-06 LAB
GLUCOSE BLDC GLUCOMTR-MCNC: 131 MG/DL (ref 70–99)
GLUCOSE BLDC GLUCOMTR-MCNC: 149 MG/DL (ref 70–99)
GLUCOSE BLDC GLUCOMTR-MCNC: 179 MG/DL (ref 70–99)
MAGNESIUM SERPL-MCNC: 2.4 MG/DL (ref 1.6–2.3)
PHOSPHATE SERPL-MCNC: 2.5 MG/DL (ref 2.5–4.5)
POTASSIUM BLD-SCNC: 4 MMOL/L (ref 3.4–5.3)
VANCOMYCIN SERPL-MCNC: 7.3 MG/L

## 2021-08-06 PROCEDURE — 84132 ASSAY OF SERUM POTASSIUM: CPT | Performed by: PHYSICIAN ASSISTANT

## 2021-08-06 PROCEDURE — 71045 X-RAY EXAM CHEST 1 VIEW: CPT

## 2021-08-06 PROCEDURE — 36415 COLL VENOUS BLD VENIPUNCTURE: CPT | Performed by: THORACIC SURGERY (CARDIOTHORACIC VASCULAR SURGERY)

## 2021-08-06 PROCEDURE — 80202 ASSAY OF VANCOMYCIN: CPT | Performed by: THORACIC SURGERY (CARDIOTHORACIC VASCULAR SURGERY)

## 2021-08-06 PROCEDURE — 258N000003 HC RX IP 258 OP 636: Performed by: THORACIC SURGERY (CARDIOTHORACIC VASCULAR SURGERY)

## 2021-08-06 PROCEDURE — 71045 X-RAY EXAM CHEST 1 VIEW: CPT | Mod: 76

## 2021-08-06 PROCEDURE — 250N000011 HC RX IP 250 OP 636

## 2021-08-06 PROCEDURE — C9113 INJ PANTOPRAZOLE SODIUM, VIA: HCPCS | Performed by: THORACIC SURGERY (CARDIOTHORACIC VASCULAR SURGERY)

## 2021-08-06 PROCEDURE — 250N000013 HC RX MED GY IP 250 OP 250 PS 637: Performed by: PHYSICIAN ASSISTANT

## 2021-08-06 PROCEDURE — 250N000011 HC RX IP 250 OP 636: Performed by: STUDENT IN AN ORGANIZED HEALTH CARE EDUCATION/TRAINING PROGRAM

## 2021-08-06 PROCEDURE — 250N000013 HC RX MED GY IP 250 OP 250 PS 637: Performed by: STUDENT IN AN ORGANIZED HEALTH CARE EDUCATION/TRAINING PROGRAM

## 2021-08-06 PROCEDURE — 258N000003 HC RX IP 258 OP 636: Performed by: STUDENT IN AN ORGANIZED HEALTH CARE EDUCATION/TRAINING PROGRAM

## 2021-08-06 PROCEDURE — 120N000002 HC R&B MED SURG/OB UMMC

## 2021-08-06 PROCEDURE — 83735 ASSAY OF MAGNESIUM: CPT | Performed by: PHYSICIAN ASSISTANT

## 2021-08-06 PROCEDURE — 250N000009 HC RX 250: Performed by: PHYSICIAN ASSISTANT

## 2021-08-06 PROCEDURE — 71045 X-RAY EXAM CHEST 1 VIEW: CPT | Mod: 26 | Performed by: RADIOLOGY

## 2021-08-06 PROCEDURE — 84100 ASSAY OF PHOSPHORUS: CPT | Performed by: PHYSICIAN ASSISTANT

## 2021-08-06 PROCEDURE — 250N000011 HC RX IP 250 OP 636: Performed by: THORACIC SURGERY (CARDIOTHORACIC VASCULAR SURGERY)

## 2021-08-06 RX ORDER — POTASSIUM CHLORIDE 20MEQ/15ML
20 LIQUID (ML) ORAL 2 TIMES DAILY
Status: DISCONTINUED | OUTPATIENT
Start: 2021-08-06 | End: 2021-08-10 | Stop reason: HOSPADM

## 2021-08-06 RX ORDER — PIPERACILLIN SODIUM, TAZOBACTAM SODIUM 4; .5 G/20ML; G/20ML
4.5 INJECTION, POWDER, LYOPHILIZED, FOR SOLUTION INTRAVENOUS EVERY 6 HOURS
Status: DISCONTINUED | OUTPATIENT
Start: 2021-08-06 | End: 2021-08-07

## 2021-08-06 RX ORDER — ACETAMINOPHEN 325 MG/1
650 TABLET ORAL EVERY 4 HOURS PRN
Status: DISCONTINUED | OUTPATIENT
Start: 2021-08-06 | End: 2021-08-10 | Stop reason: HOSPADM

## 2021-08-06 RX ADMIN — PIPERACILLIN SODIUM AND TAZOBACTAM SODIUM 4.5 G: 4; .5 INJECTION, POWDER, LYOPHILIZED, FOR SOLUTION INTRAVENOUS at 18:24

## 2021-08-06 RX ADMIN — BACITRACIN ZINC: 500 OINTMENT TOPICAL at 08:22

## 2021-08-06 RX ADMIN — LABETALOL HYDROCHLORIDE 20 MG: 5 INJECTION, SOLUTION INTRAVENOUS at 00:19

## 2021-08-06 RX ADMIN — PANTOPRAZOLE SODIUM 40 MG: 40 INJECTION, POWDER, FOR SOLUTION INTRAVENOUS at 08:24

## 2021-08-06 RX ADMIN — ANTISEPTIC SURGICAL SCRUB: 0.04 SOLUTION TOPICAL at 08:27

## 2021-08-06 RX ADMIN — ANTISEPTIC SURGICAL SCRUB: 0.04 SOLUTION TOPICAL at 20:24

## 2021-08-06 RX ADMIN — CHLORHEXIDINE GLUCONATE 0.12% ORAL RINSE 15 ML: 1.2 LIQUID ORAL at 20:24

## 2021-08-06 RX ADMIN — PIPERACILLIN SODIUM AND TAZOBACTAM SODIUM 3.38 G: 3; .375 INJECTION, POWDER, LYOPHILIZED, FOR SOLUTION INTRAVENOUS at 06:23

## 2021-08-06 RX ADMIN — VANCOMYCIN HYDROCHLORIDE 1750 MG: 1 INJECTION, POWDER, LYOPHILIZED, FOR SOLUTION INTRAVENOUS at 16:25

## 2021-08-06 RX ADMIN — PIPERACILLIN SODIUM AND TAZOBACTAM SODIUM 3.38 G: 3; .375 INJECTION, POWDER, LYOPHILIZED, FOR SOLUTION INTRAVENOUS at 00:19

## 2021-08-06 RX ADMIN — PIPERACILLIN SODIUM AND TAZOBACTAM SODIUM 4.5 G: 4; .5 INJECTION, POWDER, LYOPHILIZED, FOR SOLUTION INTRAVENOUS at 23:51

## 2021-08-06 RX ADMIN — VANCOMYCIN HYDROCHLORIDE 1250 MG: 100 INJECTION, POWDER, LYOPHILIZED, FOR SOLUTION INTRAVENOUS at 03:09

## 2021-08-06 RX ADMIN — ENOXAPARIN SODIUM 40 MG: 100 INJECTION SUBCUTANEOUS at 06:23

## 2021-08-06 RX ADMIN — FLUCONAZOLE IN SODIUM CHLORIDE 200 MG: 2 INJECTION, SOLUTION INTRAVENOUS at 14:23

## 2021-08-06 RX ADMIN — POTASSIUM CHLORIDE 20 MEQ: 20 SOLUTION ORAL at 14:23

## 2021-08-06 RX ADMIN — PIPERACILLIN SODIUM AND TAZOBACTAM SODIUM 3.38 G: 3; .375 INJECTION, POWDER, LYOPHILIZED, FOR SOLUTION INTRAVENOUS at 12:28

## 2021-08-06 RX ADMIN — OXYCODONE HYDROCHLORIDE 5 MG: 5 SOLUTION ORAL at 03:15

## 2021-08-06 RX ADMIN — POTASSIUM CHLORIDE 20 MEQ: 20 SOLUTION ORAL at 20:24

## 2021-08-06 RX ADMIN — BACITRACIN ZINC: 500 OINTMENT TOPICAL at 20:24

## 2021-08-06 ASSESSMENT — ACTIVITIES OF DAILY LIVING (ADL)
ADLS_ACUITY_SCORE: 19
ADLS_ACUITY_SCORE: 17
ADLS_ACUITY_SCORE: 17
ADLS_ACUITY_SCORE: 19
ADLS_ACUITY_SCORE: 17
ADLS_ACUITY_SCORE: 19
DEPENDENT_IADLS:: INDEPENDENT

## 2021-08-06 NOTE — PLAN OF CARE
"BP (!) 143/92   Pulse 94   Temp 98.6  F (37  C) (Oral)   Resp 20   Ht 1.88 m (6' 2\")   Wt 101.5 kg (223 lb 12.3 oz)   SpO2 97%   BMI 28.73 kg/m      Status: VSS.  Pain/Nausea: Denies pain and nausea.   Mobility: SBA with IV pole.   Diet: NPO with mouth swabs. TF decreased from 70mL/hr to 40mL/hr and free water flushes 100mL q2hr. BS q4hr.    Labs:  K 4.0  LDAs: Pharyngostomy- irrigated with 10mL NS. R CT -to water seal. L PIV-infusing TKO and abx. L PIV-SL. G tube-to gravity drain. J tube- TF and meds.   Skin/incisions: WDL.   Neuro: WDL.   Respiratory: WDL. Denies SOB. RA  Cardiac: WDL.  GI/: WDL. Diarrhea reported per pt several times during shift-TF decreased. Voiding adequately without difficulty.   New Changes: Ken stockings worn per pt request. TF rate changed.   Plan: Continue with POC.     "

## 2021-08-06 NOTE — CONSULTS
Care Management Initial Consult    General Information  Assessment completed with: Patient,    Type of CM/SW Visit: Initial Assessment    Primary Care Provider verified and updated as needed: Yes   Readmission within the last 30 days:        Reason for Consult: discharge planning  Advance Care Planning: Advance Care Planning Reviewed: no concerns identified          Communication Assessment  Patient's communication style: spoken language (English or Bilingual)    Hearing Difficulty or Deaf: no   Wear Glasses or Blind: yes    Cognitive  Cognitive/Neuro/Behavioral: WDL  Level of Consciousness: alert  Arousal Level: opens eyes spontaneously  Orientation: oriented x 4  Mood/Behavior: calm, cooperative          Living Environment:   People in home: spouse     Current living Arrangements: house      Able to return to prior arrangements:         Family/Social Support:  Care provided by:    Provides care for: no one  Marital Status:   Wife          Description of Support System: Supportive, Involved    Support Assessment: Adequate family and caregiver support, Adequate social supports    Current Resources:   Patient receiving home care services: No     Community Resources: None  Equipment currently used at home: none  Supplies currently used at home: None    Employment/Financial:  Employment Status: employed full-time (farmer)        Financial Concerns: No concerns identified           Lifestyle & Psychosocial Needs:  Social Determinants of Health     Tobacco Use:      Smoking Tobacco Use:      Smokeless Tobacco Use:    Alcohol Use:      Frequency of Alcohol Consumption:      Average Number of Drinks:      Frequency of Binge Drinking:    Financial Resource Strain:      Difficulty of Paying Living Expenses:    Food Insecurity:      Worried About Running Out of Food in the Last Year:      Ran Out of Food in the Last Year:    Transportation Needs:      Lack of Transportation (Medical):      Lack of Transportation  (Non-Medical):    Physical Activity:      Days of Exercise per Week:      Minutes of Exercise per Session:    Stress:      Feeling of Stress :    Social Connections:      Frequency of Communication with Friends and Family:      Frequency of Social Gatherings with Friends and Family:      Attends Adventism Services:      Active Member of Clubs or Organizations:      Attends Club or Organization Meetings:      Marital Status:    Intimate Partner Violence:      Fear of Current or Ex-Partner:      Emotionally Abused:      Physically Abused:      Sexually Abused:    Depression:      PHQ-2 Score:    Housing Stability:      Unable to Pay for Housing in the Last Year:      Number of Places Lived in the Last Year:      Unstable Housing in the Last Year:        Functional Status:  Prior to admission patient needed assistance:   Dependent ADLs:: Independent  Dependent IADLs:: Independent  Assesssment of Functional Status: At functional baseline    Mental Health Status:  Mental Health Status: No Current Concerns       Chemical Dependency Status:  Chemical Dependency Status: No Current Concerns             Values/Beliefs:  Spiritual, Cultural Beliefs, Adventism Practices, Values that affect care: no               Additional Information:  Patient is a 61 year old male esophageal perforation now s/p EGD, GJ placement, pharyngostomy tube placement and foreign body removal.  Patient is currently requiring tube feedings, unknown what patients needs will be at discharge at this time.  OT evaluated the patient and are recommending home with assist.  Met with patient at bedside to introduce RNCC role and discuss discharge planning.  Patient lives in [place], [place] with his wife.  He is a full time farmer.  He will have assistance from his wife at discharge.  He and his wife are under the understanding that he will likely not leave on tube feedings.  Discussed that if he does need the tube feedings at discharge that he and his wife could  "go to the [facility] for education.  Referral sent to [facility] to check benefits for coverage just in case. Per I \"Patient Janet Urias has coverage for enteral TF with his [employer] plan, ded $6950 met $1498.92. Formula is also covered since he meets the sole source criteria plan requires.\"  RNCC will continue to follow and assist with discharge planning as needed.         Gayle Elias RNCC  Phone: [phone]  Pager: [phone]  To contact the weekend RNCC, Page: [phone]        "

## 2021-08-06 NOTE — PROGRESS NOTES
STAFF ADDENDUM:  I saw and evaluated Mr. Urias and agree with the resident s findings and plan of care as documented in the resident s note and edited by me, as applicable.      Doing well.  Plan to remove left tube  Tanisha Mueller MD

## 2021-08-06 NOTE — PHARMACY-VANCOMYCIN DOSING SERVICE
"Pharmacy Vancomycin Note  Date of Service 2021  Patient's  1960   61 year old, male    Indication: Intra-abdominal infection  Day of Therapy: 3  Current vancomycin regimen:  1250 mg IV q12h  Current vancomycin monitoring method: AUC  Current vancomycin therapeutic monitoring goal: 400-600 mg*h/L    Current estimated CrCl = Estimated Creatinine Clearance: 105.9 mL/min (based on SCr of 0.92 mg/dL).    Creatinine for last 3 days  2021:  6:39 AM Creatinine 0.95 mg/dL;  4:30 PM Creatinine 0.97 mg/dL  2021: 10:20 AM Creatinine 0.92 mg/dL    Recent Vancomycin Levels (past 3 days)  2021: 11:32 AM Vancomycin 7.3 mg/L    Vancomycin IV Administrations (past 72 hours)                   vancomycin 1250 mg in 0.9% NaCl 250 mL intermittent infusion 1,250 mg (mg) 1,250 mg New Bag 21 0309     1,250 mg New Bag 21 1345     1,250 mg New Bag  0211     1,250 mg New Bag 21 1624                Nephrotoxins and other renal medications (From now, onward)    Start     Dose/Rate Route Frequency Ordered Stop    21 1600  piperacillin-tazobactam (ZOSYN) 3.375 g vial to attach to  mL bag     Note to Pharmacy: For [facility], [facility] and [facility]: For Zosyn-naive patients, use the \"Zosyn initial dose + extended infusion\" order panel.    3.375 g  over 30 Minutes Intravenous EVERY 6 HOURS 21 1244      21 1400  vancomycin 1250 mg in 0.9% NaCl 250 mL intermittent infusion 1,250 mg      1,250 mg  over 90 Minutes Intravenous EVERY 12 HOURS 21 1321               Contrast Orders - past 72 hours (72h ago, onward)    Start     Dose/Rate Route Frequency Ordered Stop    21 1140  iopamidol 76% (ISOVUE 370) + NaCl 0.9%  Status:  Discontinued        PRN 21 1205 21 1208          Interpretation of levels and current regimen:  Vancomycin level is reflective of AUC less than 400    Has serum creatinine changed greater than 50% in last 72 hours: No    Urine output:  unable to " determine    Renal Function: Stable    Regimen: 1250 mg IV every 12 hours  Exposure target: AUC24 (range)400-600 mg/L.hr   AUC24,ss: 361 mg/L.hr  Probability of AUC24 > 400: 30 %  Ctrough,ss: 9.2 mg/L  Probability of Ctrough,ss > 20: 2 %  Probability of nephrotoxicity (Lodise FABIAN 2009): 5 %      Plan:  1. Increase Dose to 1750 mg IV every 12 hours.     Regimen: 1750 mg IV every 12 hours  Exposure target: AUC24 (range)400-600 mg/L.hr   AUC24,ss: 504 mg/L.hr  Probability of AUC24 > 400: 88 %  Ctrough,ss: 13.1 mg/L  Probability of Ctrough,ss > 20: 12 %  Probability of nephrotoxicity (Lodise FABIAN 2009): 8 %    2. Vancomycin monitoring method: AUC  3. Vancomycin therapeutic monitoring goal: 400-600 mg*h/L  4. Pharmacy will check vancomycin levels as appropriate in 1-3 Days.  5. Serum creatinine levels will be ordered every 48 hours.    Sona Interiano PharmD, BCPS  8/6/2021 1:54 PM

## 2021-08-06 NOTE — PROGRESS NOTES
Therapy: ENTERAL TF   Insurance: [employer]   Ded: $5950.00  Met: $1498.92    Co-Insurance: 100% coverage after ded     Please contact Intake with any questions, [phone] or In Basket pool, [facility] ([phone]).

## 2021-08-06 NOTE — PROGRESS NOTES
Thoracic Surgery Progress Note  08/06/2021       Subjective:  Having some pain, borderline control. Otherwise doing okay. Tolerating tube feeds     Objective:  Temp:  [98  F (36.7  C)-98.8  F (37.1  C)] 98.6  F (37  C)  Pulse:  [84-97] 94  Resp:  [16-20] 20  BP: (143-168)/() 143/92  SpO2:  [93 %-97 %] 97 %    I/O last 3 completed shifts:  In: 2386.67 [I.V.:896.67; Other:60; NG/GT:270]  Out: 1100 [Urine:600; Emesis/NG output:225; Drains:225; Chest Tube:50]      Gen: Awake, alert, NAD  Resp: NLB on NC  Chest: bilateral chest tubes with minimal serosanguinous output, no air leak  Abd: soft, nondistended, nontender  Incision: c/d/I  Ext: WWP, no edema    CT output serosang     Labs:  Recent Labs   Lab 08/05/21  1020 08/04/21  1630 08/04/21  1355 08/04/21  0639   WBC 14.0* 16.2*  --  20.3*   HGB 15.3 15.9 13.2* 17.0    250  --  247       Recent Labs   Lab 08/06/21  1145 08/06/21  0818 08/05/21  2159 08/05/21  1020 08/04/21  1630 08/04/21  0639   NA  --   --   --  143 142 141   POTASSIUM  --   --   --  3.6 3.8 3.7   CHLORIDE  --   --   --  109 110* 110*   CO2  --   --   --  27 29 24   BUN  --   --   --  20 24 24   CR  --   --   --  0.92 0.97 0.95   * 149* 141* 178* 198* 232*   ANA  --   --   --  8.9 8.6 9.0   MAG  --   --   --  2.4*  --   --    PHOS  --   --   --  2.0*  --   --        Imaging:  CXR - decreased bilateral opacities, maybe small apical ptx on left     Assessment/Plan:   61 year old male with concern for esophageal perforation now s/p EGD, GJ placement, pharyngostomy tube placement and foreign body removal. Found to have long, vertical esophageal laceration without concern for perforation on on-table esophagram. Post-operatively issues with agitation requiring precedex gtt now resolved. Doing well.    - Continue tylenol, oxycodone via J-tube  - Remove last chest tube today  - AM CXR  - Strict NPO, pharyngostomy tube to LIS  - G tube to gravity, J tube for feeds and meds  - Advance J tube  feeds per nutrition  - discontinue Vanc and IVFs   - replace lytes  - Continue broad spectrum antibiotics for now: zosyn/fluconazole (8/4-)  - PT/OT, OOB, ambulate  - Lovenox for DVT ppx     Seen, examined, and discussed with fellow who will discuss with staff  - - - - - - - - - - - - - - - - - -  Kevin Milliagn PA-C  Thoracic & Foregut Surgery

## 2021-08-06 NOTE — PROGRESS NOTES
"BP (!) 153/94 (BP Location: Left arm)   Pulse 87   Temp 98.5  F (36.9  C) (Oral)   Resp 20   Ht 1.88 m (6' 2\")   Wt 101.5 kg (223 lb 12.3 oz)   SpO2 93%   BMI 28.73 kg/m       Neuro: AOx4, anxious at times ,can make needs know  Cardiac: /100, done several times. Provider paged, labelol ordered and given, /94  Respiratory: Denies SOB, on 1L at night due to stats decreased to 88%  GI/: voiding, +BS/flatus, medium BM this shift  Diet/Appetite: Strict NO, mouth swabs ok, suction at bedside pt uses it independently. TF @ 70 mL/hr  Skin: No new deficits noted  LDA: (L) CT to waterseal, triple lumen internal jugular, (L/R PIV) phargyostoy tube to LIS, G tube to gravity, J tube for tube feed and meds  Activity: SBA  Pain: Shoulder and back pain managed with PRN Oxycodone and Tylenol  Plan: Monitor BP, Continue POC    "

## 2021-08-06 NOTE — PROGRESS NOTES
"Transfer  Transferred to: 7B  Via:bed  Reason for transfer:Pt no longer appropriate for 6B- improved patient condition  Family: Aware of transfer  Belongings: Packed and sent with pt  Chart: Delivered with pt to next unit  Medications: Meds sent to new unit with pt  Report given to: given by previous shift - unsure  Pt status: stable    BP (!) 144/94 (BP Location: Right arm)   Pulse 97   Temp 98  F (36.7  C) (Oral)   Resp 18   Ht 1.88 m (6' 2\")   Wt 101.5 kg (223 lb 12.3 oz)   SpO2 93%   BMI 28.73 kg/m        "

## 2021-08-06 NOTE — PROGRESS NOTES
CLINICAL NUTRITION SERVICES - BRIEF NOTE     Nutrition Prescription    RECOMMENDATIONS FOR MDs/PROVIDERS TO ORDER:  - Currently novolog ordered as with meals but pt is NPO with continuous TF--recommend adjust for around the clock coverage. (Textpaged)    Recommendations already ordered by Registered Dietitian (RD):  - Due to significant loose stools, will switch Jevity 1.5 to Osmolite 1.5 and resume @ 40 ml/hr and increase by 15 ml q 8 hr to goal of 70 ml/hr. Osmolite 1.5 Segundo @ goal of  70ml/hr  (1680ml/day)  will provide: 2520 kcals, 105 g PRO, 1280 ml free H20, 342 g CHO, and 0 g fiber daily.  - Adjust MD ordered water flushes from 200 ml q 4 hr to 100 ml q 2 hr (TF + flushes will provide 2480ml free water (~25 ml/kg)  - Collaborated with other providers-PharmD to switch liquid tylenol to crushed tablet form.     Future/Additional Recommendations:  - Follow tolerance of Osmolite 1.5, stooling, labs and plan for resume of oral intake.      EVALUATION OF THE PROGRESS TOWARD GOALS   Diet: NPO  Nutrition Support: Jevity 1.5 ramped up to goal rate of 70 ml/hr yesterday.  Jevity 1.5 Segundo @ goal of  70ml/hr  (1680ml/day)  will provide: 2520 kcals, 107 g PRO, 1276 ml free H20, 362 g CHO, and 35 g fiber daily.  Water flushes increased this AM to 200 ml q 4 hr via J port (can give 100 ml q 2 hr).  No IVF.       NEW FINDINGS   Pt complaining of a lot of diarrhea.  Had 4 watery loose stools today on day shift and felt like he could have another.  He had 2 stools over noc also.   Medications--includes liquid tylenol q 4 hr prn, miralax daily, IV zosyn, IV vanco.   Labs: Phos 2.0 8/5, 2.5 8/6.      INTERVENTIONS  - Education--discussed impact of higher fiber formula with rapid advance and liquid tylenol and antibiotics on stooling. Discussed switching to Osmolite 1.5 prior to weekend and re-evaluating on Monday.   Enteral Nutrition--adjust  Water flushes--adjust distribution    Monitoring/Evaluation  Progress toward goals will  be monitored and evaluated per protocol.     Lupis Wilkerson RD, LD   7B (M-F) Pager: [phone]  RD Weekend Pager: [phone]

## 2021-08-06 NOTE — PLAN OF CARE
"BP (!) 152/92 (BP Location: Right arm)   Pulse 88   Temp 98.3  F (36.8  C) (Oral)   Resp 20   Ht 1.88 m (6' 2\")   Wt 101.5 kg (223 lb 12.3 oz)   SpO2 99%   BMI 28.73 kg/m        Activity: SBA with IV pole.   Pain/Nausea: Denies pain and nausea.   Neuro: WDL.   Respiratory: WDL. Denies SOB. RA  Cardiac: WDL.  GI/: WDL. Diarrhea, TF decreased. Voiding adequately without difficulty.   Diet: NPO with mouth swabs. TF decreased from 70mL/hr to 40mL/hr and free water flushes 100mL q2hr. BS q4hr.    LDAs: Pharyngostomy- irrigated with 10mL NS. R CT -to water seal. L PIV-infusing TKO and abx. L PIV-SL. G tube-to gravity drain. J tube- TF and meds.   Skin/incisions: WDL.   Plan: Continue with POC.     "

## 2021-08-07 ENCOUNTER — APPOINTMENT (OUTPATIENT)
Dept: GENERAL RADIOLOGY | Facility: CLINIC | Age: 61
DRG: 327 | End: 2021-08-07
Payer: COMMERCIAL

## 2021-08-07 ENCOUNTER — APPOINTMENT (OUTPATIENT)
Dept: OCCUPATIONAL THERAPY | Facility: CLINIC | Age: 61
DRG: 327 | End: 2021-08-07
Payer: COMMERCIAL

## 2021-08-07 LAB
ANION GAP SERPL CALCULATED.3IONS-SCNC: 4 MMOL/L (ref 3–14)
BUN SERPL-MCNC: 18 MG/DL (ref 7–30)
CALCIUM SERPL-MCNC: 8.9 MG/DL (ref 8.5–10.1)
CHLORIDE BLD-SCNC: 113 MMOL/L (ref 94–109)
CO2 SERPL-SCNC: 28 MMOL/L (ref 20–32)
CREAT SERPL-MCNC: 0.87 MG/DL (ref 0.66–1.25)
ERYTHROCYTE [DISTWIDTH] IN BLOOD BY AUTOMATED COUNT: 13.6 % (ref 10–15)
GFR SERPL CREATININE-BSD FRML MDRD: >90 ML/MIN/1.73M2
GLUCOSE BLD-MCNC: 130 MG/DL (ref 70–99)
GLUCOSE BLDC GLUCOMTR-MCNC: 110 MG/DL (ref 70–99)
GLUCOSE BLDC GLUCOMTR-MCNC: 118 MG/DL (ref 70–99)
GLUCOSE BLDC GLUCOMTR-MCNC: 125 MG/DL (ref 70–99)
GLUCOSE BLDC GLUCOMTR-MCNC: 129 MG/DL (ref 70–99)
HCT VFR BLD AUTO: 43.4 % (ref 40–53)
HGB BLD-MCNC: 14 G/DL (ref 13.3–17.7)
MAGNESIUM SERPL-MCNC: 2.4 MG/DL (ref 1.6–2.3)
MCH RBC QN AUTO: 29.9 PG (ref 26.5–33)
MCHC RBC AUTO-ENTMCNC: 32.3 G/DL (ref 31.5–36.5)
MCV RBC AUTO: 93 FL (ref 78–100)
PHOSPHATE SERPL-MCNC: 3.2 MG/DL (ref 2.5–4.5)
PLATELET # BLD AUTO: 220 10E3/UL (ref 150–450)
POTASSIUM BLD-SCNC: 3.7 MMOL/L (ref 3.4–5.3)
RBC # BLD AUTO: 4.68 10E6/UL (ref 4.4–5.9)
SODIUM SERPL-SCNC: 145 MMOL/L (ref 133–144)
WBC # BLD AUTO: 7.1 10E3/UL (ref 4–11)

## 2021-08-07 PROCEDURE — 85027 COMPLETE CBC AUTOMATED: CPT | Performed by: STUDENT IN AN ORGANIZED HEALTH CARE EDUCATION/TRAINING PROGRAM

## 2021-08-07 PROCEDURE — 97110 THERAPEUTIC EXERCISES: CPT | Mod: GO

## 2021-08-07 PROCEDURE — 71045 X-RAY EXAM CHEST 1 VIEW: CPT

## 2021-08-07 PROCEDURE — 250N000011 HC RX IP 250 OP 636: Performed by: THORACIC SURGERY (CARDIOTHORACIC VASCULAR SURGERY)

## 2021-08-07 PROCEDURE — 84100 ASSAY OF PHOSPHORUS: CPT | Performed by: PHYSICIAN ASSISTANT

## 2021-08-07 PROCEDURE — 36415 COLL VENOUS BLD VENIPUNCTURE: CPT | Performed by: STUDENT IN AN ORGANIZED HEALTH CARE EDUCATION/TRAINING PROGRAM

## 2021-08-07 PROCEDURE — 258N000003 HC RX IP 258 OP 636: Performed by: THORACIC SURGERY (CARDIOTHORACIC VASCULAR SURGERY)

## 2021-08-07 PROCEDURE — 97530 THERAPEUTIC ACTIVITIES: CPT | Mod: GO

## 2021-08-07 PROCEDURE — 250N000013 HC RX MED GY IP 250 OP 250 PS 637: Performed by: PHYSICIAN ASSISTANT

## 2021-08-07 PROCEDURE — 71045 X-RAY EXAM CHEST 1 VIEW: CPT | Mod: 26 | Performed by: RADIOLOGY

## 2021-08-07 PROCEDURE — 250N000011 HC RX IP 250 OP 636: Performed by: STUDENT IN AN ORGANIZED HEALTH CARE EDUCATION/TRAINING PROGRAM

## 2021-08-07 PROCEDURE — 250N000013 HC RX MED GY IP 250 OP 250 PS 637: Performed by: THORACIC SURGERY (CARDIOTHORACIC VASCULAR SURGERY)

## 2021-08-07 PROCEDURE — 83735 ASSAY OF MAGNESIUM: CPT | Performed by: PHYSICIAN ASSISTANT

## 2021-08-07 PROCEDURE — 250N000013 HC RX MED GY IP 250 OP 250 PS 637: Performed by: STUDENT IN AN ORGANIZED HEALTH CARE EDUCATION/TRAINING PROGRAM

## 2021-08-07 PROCEDURE — 80048 BASIC METABOLIC PNL TOTAL CA: CPT | Performed by: STUDENT IN AN ORGANIZED HEALTH CARE EDUCATION/TRAINING PROGRAM

## 2021-08-07 PROCEDURE — 120N000002 HC R&B MED SURG/OB UMMC

## 2021-08-07 RX ADMIN — PANTOPRAZOLE SODIUM 40 MG: 40 TABLET, DELAYED RELEASE ORAL at 08:09

## 2021-08-07 RX ADMIN — CHLORHEXIDINE GLUCONATE 0.12% ORAL RINSE 15 ML: 1.2 LIQUID ORAL at 08:10

## 2021-08-07 RX ADMIN — CHLORHEXIDINE GLUCONATE 0.12% ORAL RINSE 15 ML: 1.2 LIQUID ORAL at 19:48

## 2021-08-07 RX ADMIN — PIPERACILLIN SODIUM AND TAZOBACTAM SODIUM 4.5 G: 4; .5 INJECTION, POWDER, LYOPHILIZED, FOR SOLUTION INTRAVENOUS at 12:59

## 2021-08-07 RX ADMIN — BACITRACIN ZINC: 500 OINTMENT TOPICAL at 08:10

## 2021-08-07 RX ADMIN — ANTISEPTIC SURGICAL SCRUB: 0.04 SOLUTION TOPICAL at 19:47

## 2021-08-07 RX ADMIN — ENOXAPARIN SODIUM 40 MG: 100 INJECTION SUBCUTANEOUS at 06:31

## 2021-08-07 RX ADMIN — FLUCONAZOLE IN SODIUM CHLORIDE 200 MG: 2 INJECTION, SOLUTION INTRAVENOUS at 14:22

## 2021-08-07 RX ADMIN — POTASSIUM CHLORIDE 20 MEQ: 20 SOLUTION ORAL at 08:10

## 2021-08-07 RX ADMIN — HYDROMORPHONE HYDROCHLORIDE 0.2 MG: 0.2 INJECTION, SOLUTION INTRAMUSCULAR; INTRAVENOUS; SUBCUTANEOUS at 23:47

## 2021-08-07 RX ADMIN — BACITRACIN ZINC: 500 OINTMENT TOPICAL at 19:46

## 2021-08-07 RX ADMIN — POTASSIUM CHLORIDE 20 MEQ: 20 SOLUTION ORAL at 19:47

## 2021-08-07 RX ADMIN — ANTISEPTIC SURGICAL SCRUB: 0.04 SOLUTION TOPICAL at 08:12

## 2021-08-07 RX ADMIN — VANCOMYCIN HYDROCHLORIDE 1750 MG: 1 INJECTION, POWDER, LYOPHILIZED, FOR SOLUTION INTRAVENOUS at 04:09

## 2021-08-07 ASSESSMENT — ACTIVITIES OF DAILY LIVING (ADL)
ADLS_ACUITY_SCORE: 17

## 2021-08-07 ASSESSMENT — MIFFLIN-ST. JEOR: SCORE: 1891.7

## 2021-08-07 NOTE — PLAN OF CARE
Occupational Therapy Discharge Summary    Reason for therapy discharge:    All goals and outcomes met, no further needs identified.    Progress towards therapy goal(s). See goals on Care Plan in [facility] electronic health record for goal details.  Goals met    Therapy recommendation(s):    Continue home exercise program. Thoracotomy HEP to maintain functional ROM BUEs. Spouse A for heavier IADLs due to precautions. Recommend pt cont with IND amb x 4 during the day.

## 2021-08-07 NOTE — PROGRESS NOTES
Thoracic Surgery Progress Note  08/07/2021       Subjective:  No acute events. Doing better today. Was able to get some sleep. Tolerating tube feeds.     Objective:  Temp:  [97.9  F (36.6  C)-98.3  F (36.8  C)] 98.1  F (36.7  C)  Pulse:  [76-88] 77  Resp:  [16-20] 16  BP: (151-178)/() 153/90  SpO2:  [98 %-100 %] 98 %    I/O last 3 completed shifts:  In: 1660 [I.V.:700; Other:30; NG/GT:300]  Out: 525 [Emesis/NG output:275; Drains:250]      Gen: Awake, alert, NAD  Resp: NLB on NC  Abd: soft, nondistended, nontender  Incision: c/d/I  Ext: WWP, no edema       Labs:  Recent Labs   Lab 08/07/21  0803 08/05/21  1020 08/04/21  1630   WBC 7.1 14.0* 16.2*   HGB 14.0 15.3 15.9    222 250       Recent Labs   Lab 08/07/21  0813 08/07/21  0803 08/06/21  2158 08/06/21  1132 08/05/21  1020 08/04/21  1630   NA  --  145*  --   --  143 142   POTASSIUM  --  3.7  --  4.0 3.6 3.8   CHLORIDE  --  113*  --   --  109 110*   CO2  --  28  --   --  27 29   BUN  --  18  --   --  20 24   CR  --  0.87  --   --  0.92 0.97   * 130* 118*  --  178* 198*   ANA  --  8.9  --   --  8.9 8.6   MAG  --  2.4*  --  2.4* 2.4*  --    PHOS  --  3.2  --  2.5 2.0*  --      Cx: NGTD    Imaging:  None     Assessment/Plan:   61 year old male with concern for esophageal perforation now s/p EGD, GJ placement, pharyngostomy tube placement and foreign body removal. Found to have long, vertical esophageal laceration without concern for perforation on on-table esophagram. Post-operatively issues with agitation requiring precedex gtt now resolved. Doing well.    - Continue tylenol, oxycodone via J-tube  - Strict NPO, pharyngostomy tube to LIS  - G tube to gravity, J tube for feeds and meds  - Advance tube feeds to goal  - Zosyn, fluconazole, will discontinue today  - PT/OT, OOB, ambulate  - Lovenox for DVT ppx  - Discharge planning: will likely need TF teaching and home care     Seen, examined, and discussed with fellow who will discuss with staff  - - -  - - - - - - - - - - - - - - -  Jerry Medina MD  PGY-3, General Surgery

## 2021-08-08 ENCOUNTER — ANESTHESIA EVENT (OUTPATIENT)
Dept: SURGERY | Facility: CLINIC | Age: 61
DRG: 327 | End: 2021-08-08
Payer: COMMERCIAL

## 2021-08-08 ENCOUNTER — APPOINTMENT (OUTPATIENT)
Dept: GENERAL RADIOLOGY | Facility: CLINIC | Age: 61
DRG: 327 | End: 2021-08-08
Payer: COMMERCIAL

## 2021-08-08 LAB
ANION GAP SERPL CALCULATED.3IONS-SCNC: 5 MMOL/L (ref 3–14)
BUN SERPL-MCNC: 19 MG/DL (ref 7–30)
CALCIUM SERPL-MCNC: 8.9 MG/DL (ref 8.5–10.1)
CHLORIDE BLD-SCNC: 110 MMOL/L (ref 94–109)
CO2 SERPL-SCNC: 27 MMOL/L (ref 20–32)
CREAT SERPL-MCNC: 0.91 MG/DL (ref 0.66–1.25)
ERYTHROCYTE [DISTWIDTH] IN BLOOD BY AUTOMATED COUNT: 13 % (ref 10–15)
GFR SERPL CREATININE-BSD FRML MDRD: 90 ML/MIN/1.73M2
GLUCOSE BLD-MCNC: 122 MG/DL (ref 70–99)
GLUCOSE BLDC GLUCOMTR-MCNC: 101 MG/DL (ref 70–99)
GLUCOSE BLDC GLUCOMTR-MCNC: 117 MG/DL (ref 70–99)
GLUCOSE BLDC GLUCOMTR-MCNC: 120 MG/DL (ref 70–99)
GLUCOSE BLDC GLUCOMTR-MCNC: 136 MG/DL (ref 70–99)
GLUCOSE BLDC GLUCOMTR-MCNC: 141 MG/DL (ref 70–99)
GLUCOSE BLDC GLUCOMTR-MCNC: 160 MG/DL (ref 70–99)
HCT VFR BLD AUTO: 42.9 % (ref 40–53)
HGB BLD-MCNC: 14.3 G/DL (ref 13.3–17.7)
MCH RBC QN AUTO: 30.4 PG (ref 26.5–33)
MCHC RBC AUTO-ENTMCNC: 33.3 G/DL (ref 31.5–36.5)
MCV RBC AUTO: 91 FL (ref 78–100)
PLATELET # BLD AUTO: 232 10E3/UL (ref 150–450)
POTASSIUM BLD-SCNC: 3.7 MMOL/L (ref 3.4–5.3)
RBC # BLD AUTO: 4.7 10E6/UL (ref 4.4–5.9)
SARS-COV-2 RNA RESP QL NAA+PROBE: NEGATIVE
SODIUM SERPL-SCNC: 142 MMOL/L (ref 133–144)
WBC # BLD AUTO: 7 10E3/UL (ref 4–11)

## 2021-08-08 PROCEDURE — 36415 COLL VENOUS BLD VENIPUNCTURE: CPT

## 2021-08-08 PROCEDURE — 71045 X-RAY EXAM CHEST 1 VIEW: CPT

## 2021-08-08 PROCEDURE — 250N000013 HC RX MED GY IP 250 OP 250 PS 637: Performed by: PHYSICIAN ASSISTANT

## 2021-08-08 PROCEDURE — U0003 INFECTIOUS AGENT DETECTION BY NUCLEIC ACID (DNA OR RNA); SEVERE ACUTE RESPIRATORY SYNDROME CORONAVIRUS 2 (SARS-COV-2) (CORONAVIRUS DISEASE [COVID-19]), AMPLIFIED PROBE TECHNIQUE, MAKING USE OF HIGH THROUGHPUT TECHNOLOGIES AS DESCRIBED BY CMS-2020-01-R: HCPCS

## 2021-08-08 PROCEDURE — 250N000013 HC RX MED GY IP 250 OP 250 PS 637

## 2021-08-08 PROCEDURE — 250N000011 HC RX IP 250 OP 636: Performed by: STUDENT IN AN ORGANIZED HEALTH CARE EDUCATION/TRAINING PROGRAM

## 2021-08-08 PROCEDURE — 80048 BASIC METABOLIC PNL TOTAL CA: CPT

## 2021-08-08 PROCEDURE — C9113 INJ PANTOPRAZOLE SODIUM, VIA: HCPCS | Performed by: THORACIC SURGERY (CARDIOTHORACIC VASCULAR SURGERY)

## 2021-08-08 PROCEDURE — 71045 X-RAY EXAM CHEST 1 VIEW: CPT | Mod: 26 | Performed by: RADIOLOGY

## 2021-08-08 PROCEDURE — 250N000011 HC RX IP 250 OP 636: Performed by: THORACIC SURGERY (CARDIOTHORACIC VASCULAR SURGERY)

## 2021-08-08 PROCEDURE — 85027 COMPLETE CBC AUTOMATED: CPT

## 2021-08-08 PROCEDURE — 120N000002 HC R&B MED SURG/OB UMMC

## 2021-08-08 RX ORDER — GUAR GUM
1 PACKET (EA) ORAL 3 TIMES DAILY
Status: DISCONTINUED | OUTPATIENT
Start: 2021-08-08 | End: 2021-08-10 | Stop reason: HOSPADM

## 2021-08-08 RX ADMIN — CHLORHEXIDINE GLUCONATE 0.12% ORAL RINSE 15 ML: 1.2 LIQUID ORAL at 08:54

## 2021-08-08 RX ADMIN — POTASSIUM CHLORIDE 20 MEQ: 20 SOLUTION ORAL at 20:41

## 2021-08-08 RX ADMIN — ENOXAPARIN SODIUM 40 MG: 100 INJECTION SUBCUTANEOUS at 06:48

## 2021-08-08 RX ADMIN — HYDROMORPHONE HYDROCHLORIDE 0.4 MG: 0.2 INJECTION, SOLUTION INTRAMUSCULAR; INTRAVENOUS; SUBCUTANEOUS at 03:05

## 2021-08-08 RX ADMIN — POTASSIUM CHLORIDE 20 MEQ: 20 SOLUTION ORAL at 08:54

## 2021-08-08 RX ADMIN — Medication 1 PACKET: at 16:56

## 2021-08-08 RX ADMIN — Medication 1 PACKET: at 20:42

## 2021-08-08 RX ADMIN — ANTISEPTIC SURGICAL SCRUB: 0.04 SOLUTION TOPICAL at 09:15

## 2021-08-08 RX ADMIN — BACITRACIN ZINC: 500 OINTMENT TOPICAL at 08:58

## 2021-08-08 RX ADMIN — ANTISEPTIC SURGICAL SCRUB: 0.04 SOLUTION TOPICAL at 20:43

## 2021-08-08 RX ADMIN — Medication 1 PACKET: at 12:52

## 2021-08-08 RX ADMIN — PANTOPRAZOLE SODIUM 40 MG: 40 INJECTION, POWDER, FOR SOLUTION INTRAVENOUS at 09:15

## 2021-08-08 RX ADMIN — BACITRACIN ZINC: 500 OINTMENT TOPICAL at 20:41

## 2021-08-08 RX ADMIN — CHLORHEXIDINE GLUCONATE 0.12% ORAL RINSE 15 ML: 1.2 LIQUID ORAL at 20:41

## 2021-08-08 ASSESSMENT — ACTIVITIES OF DAILY LIVING (ADL)
ADLS_ACUITY_SCORE: 17

## 2021-08-08 ASSESSMENT — MIFFLIN-ST. JEOR: SCORE: 1890.34

## 2021-08-08 NOTE — ANESTHESIA PREPROCEDURE EVALUATION
Anesthesia Pre-Procedure Evaluation    Patient: Janet Urias   MRN: [id number] : 1960        Preoperative Diagnosis: Esophageal laceration [K22.8]   Procedure : Procedure(s):  ESOPHAGOGASTRODUODENOSCOPY (EGD)  CREATION, PHARYNGOSTOMY     Past Medical History:   Diagnosis Date     Hypertension       Past Surgical History:   Procedure Laterality Date     ESOPHAGECTOMY N/A 2021    Procedure: Esophagogastroduodenoscopy, GJ tube placement, pharyngoscopy, removal of foreign body, esophogram, and bilateral chest tube placement;  Surgeon: Pool Aranda MD;  Location: [facility] OR     ORTHOPEDIC SURGERY        Allergies   Allergen Reactions     Atorvastatin      Pravastatin      Simvastatin       Social History     Tobacco Use     Smoking status: Unknown If Ever Smoked   Substance Use Topics     Alcohol use: Not on file      Wt Readings from Last 1 Encounters:   21 102.2 kg (225 lb 4.8 oz)        Anesthesia Evaluation   Pt has had prior anesthetic.     No history of anesthetic complications       ROS/MED HX  ENT/Pulmonary:  - neg pulmonary ROS     Neurologic:  - neg neurologic ROS     Cardiovascular:     (+) Dyslipidemia hypertension----- (-) murmur and wheezes   METS/Exercise Tolerance:     Hematologic:  - neg hematologic  ROS     Musculoskeletal:  - neg musculoskeletal ROS     GI/Hepatic: Comment: Chronic dysphagia  S/p pharyngostomy placement     (+) esophageal disease,     Renal/Genitourinary:  - neg Renal ROS     Endo:  - neg endo ROS     Psychiatric/Substance Use:  - neg psychiatric ROS     Infectious Disease:  - neg infectious disease ROS     Malignancy:       Other:            Physical Exam    Airway        Mallampati: II   TM distance: > 3 FB   Neck ROM: full   Mouth opening: > 3 cm    Respiratory Devices and Support         Dental  no notable dental history         Cardiovascular          Rhythm and rate: regular and normal (-) no systolic click and no murmur    Pulmonary   pulmonary exam  normal        breath sounds clear to auscultation   (-) no wheezes        OUTSIDE LABS:  CBC:   Lab Results   Component Value Date    WBC 7.0 08/08/2021    WBC 7.1 08/07/2021    HGB 14.3 08/08/2021    HGB 14.0 08/07/2021    HCT 42.9 08/08/2021    HCT 43.4 08/07/2021     08/08/2021     08/07/2021     BMP:   Lab Results   Component Value Date     08/08/2021     (H) 08/07/2021    POTASSIUM 3.7 08/08/2021    POTASSIUM 3.7 08/07/2021    CHLORIDE 110 (H) 08/08/2021    CHLORIDE 113 (H) 08/07/2021    CO2 27 08/08/2021    CO2 28 08/07/2021    BUN 19 08/08/2021    BUN 18 08/07/2021    CR 0.91 08/08/2021    CR 0.87 08/07/2021     (H) 08/08/2021     (H) 08/08/2021     COAGS:   Lab Results   Component Value Date    PTT 25 08/04/2021    INR 1.09 08/04/2021     POC: No results found for: BGM, HCG, HCGS  HEPATIC:   Lab Results   Component Value Date    ALBUMIN 4.0 08/04/2021    PROTTOTAL 7.6 08/04/2021    ALT 26 08/04/2021    AST 13 08/04/2021    ALKPHOS 44 08/04/2021    BILITOTAL 0.9 08/04/2021     OTHER:   Lab Results   Component Value Date    A1C 6.0 (H) 08/04/2021    ANA 8.9 08/08/2021    PHOS 3.2 08/07/2021    MAG 2.4 (H) 08/07/2021       Anesthesia Plan    ASA Status:  2   NPO Status:  NPO Appropriate    Anesthesia Type: General.     - Airway: ETT   Induction: Intravenous.   Maintenance: Balanced.   Techniques and Equipment:     - Lines/Monitors: BIS     Consents    Anesthesia Plan(s) and associated risks, benefits, and realistic alternatives discussed. Questions answered and patient/representative(s) expressed understanding.     - Discussed with:  Patient         Postoperative Care    Pain management: Oral pain medications, Multi-modal analgesia, IV analgesics.   PONV prophylaxis: Ondansetron (or other 5HT-3)     Comments:    Woke up agitated from previous anesthetic requiring dex gtt. Will bolus intraop            Micki Hummel MD

## 2021-08-08 NOTE — PLAN OF CARE
"Shift [id number]  Neuro: A/Ox4 calls appropriately.  Respiratory: on room air denies SOB  Cardiac: hypertensive, denies chest pain.  Diet: NPO with continuous tube feedings via J tube at a current rate of 55ml/hr, rate increased at 0300 from 40ml-55ml.  GI/: +BS denies loose stools this shift, voiding adequately.  Incision/Drains: G/J tube, gastric tube to gravity drainage and J tube to continuous feeding. Pharyngostomy tube to LIS. Incision site from  R/L old chest tube, scant drainage to pressure dressing.   IV Access: L/R PIV, left PIV TKO and R SL.  Pain: report pain to incision site from old chest tube site managed with PRN IV dilaudid x2.  Labs: reviewed  VS: BP (!) 148/96 (BP Location: Right arm)   Pulse 72   Temp 98  F (36.7  C) (Oral)   Resp 16   Ht 1.88 m (6' 2\")   Wt 102.2 kg (225 lb 4.8 oz)   SpO2 98%   BMI 28.93 kg/m     Activity: SBA to bathroom.  Plan: continue to manage pain, continue POC.       "

## 2021-08-08 NOTE — PROGRESS NOTES
Thoracic Surgery Progress Note  08/08/2021     Subjective:  No acute events.Continues to improve and feels better today. Was able to get some sleep. Tolerating tube feeds. Passing flatus and BM x1, noticeably loose per patient. Ambulating.      Objective:  Temp:  [97.7  F (36.5  C)-98.4  F (36.9  C)] 98.2  F (36.8  C)  Pulse:  [63-86] 86  Resp:  [16-18] 16  BP: (146-163)/() 146/94  SpO2:  [96 %-99 %] 98 %    I/O last 3 completed shifts:  In: 3870 [NG/GT:3590]  Out: 175 [Emesis/NG output:175]      Gen: Awake, alert, NAD  Resp: NLB on NC  Abd: soft, nondistended, nontender  Incision(s): c/d/I  Ext: WWP, no edema    Pharyngostomy tube Out: 250/unrecorded       Labs:  Recent Labs   Lab 08/08/21  0810 08/07/21  0803 08/05/21  1020   WBC 7.0 7.1 14.0*   HGB 14.3 14.0 15.3    220 222       Recent Labs   Lab 08/08/21  0819 08/08/21  0527 08/08/21  0028 08/07/21  0803 08/06/21  1132 08/05/21  1020 08/04/21  1630   NA  --   --   --  145*  --  143 142   POTASSIUM  --   --   --  3.7 4.0 3.6 3.8   CHLORIDE  --   --   --  113*  --  109 110*   CO2  --   --   --  28  --  27 29   BUN  --   --   --  18  --  20 24   CR  --   --   --  0.87  --  0.92 0.97   * 160* 120* 130*  --  178* 198*   ANA  --   --   --  8.9  --  8.9 8.6   MAG  --   --   --  2.4* 2.4* 2.4*  --    PHOS  --   --   --  3.2 2.5 2.0*  --      Cx: NGTD    Imaging:  None     Assessment/Plan:   61 year old male with concern for esophageal perforation now s/p EGD, GJ placement, pharyngostomy tube placement and foreign body removal. Found to have long, vertical esophageal laceration without concern for perforation on on-table esophagram. Post-operatively issues with agitation requiring precedex gtt now resolved. Doing well, no acute concerns.     - EGD tomorrow 8/9 w/ Dr. Howard. +/- Pharyngostomy removal  - Continue tylenol, oxycodone via J-tube  - Strict NPO, pharyngostomy tube to LIS  - G tube to gravity, J tube for feeds and meds  - Reach out to  Nutrition/Endo, unclear TF goals - would like to reach Goal.   - Add fiber to TFs, TID  - PT/OT, OOB, ambulate  - Lovenox for DVT ppx  - Discharge planning: will likely need TF teaching and home care     Seen, examined, and discussed with fellow who will discuss with staff    Walker Corbett MD  PGY-1, General Surgery

## 2021-08-08 NOTE — PROGRESS NOTES
VITALS:  Afebrile, B/P continues to be elevated. Labetalol ordered for  Systolic over 160.  OVSS.   Neuro: Alert & Oriented X 4. Neuro's intact.   Respiratory: On room air. WDL,  Denies SOB  Diet/Appetite: Strict NPO with mouth swabs,  Suction at bedside pt uses it independently.   Pt denies any nausea, Tube feeds at goal rate of 40cc/hour.    Skin: No new deficits noted  LDA:   Phargyostoy tube to LIS, G tube to gravity, J tube for tube feed and meds.   Left PIV saline locked.   Activity: Up in room with SBA  Pain: Pt denies any pain,   Plan: Monitor BP, Continue POC

## 2021-08-08 NOTE — PROGRESS NOTES
Activity: SBA with IV pole.   Pain/Nausea: Denies pain and nausea.   Neuro: WDL.   Respiratory: WDL.On RA,  Denies SOB.   Cardiac: WDL.  GI/: +bs. Having loose stools. TF infusing @55 ml/hr.  Skin: blistering/tape burn from tegaderm dressing noted around bilateral old chest tube sites, skin cleaned and new dressing applied w micropore tape.  Diet: NPO. TF at @ 55 ml/hr.100mL q2hr. Pro source added. ,101.  LDAs: Pharyngostomy- irrigated with 10mL NS. G tube-to gravity drain. J tube- TF and meds.   Skin/incisions: WDL.   Plan: EDG tomorrow w possible esophagostomy tube removal.  Continue with POC.

## 2021-08-09 ENCOUNTER — HOME INFUSION (PRE-WILLOW HOME INFUSION) (OUTPATIENT)
Dept: PHARMACY | Facility: CLINIC | Age: 61
End: 2021-08-09

## 2021-08-09 ENCOUNTER — ANESTHESIA (OUTPATIENT)
Dept: SURGERY | Facility: CLINIC | Age: 61
DRG: 327 | End: 2021-08-09
Payer: COMMERCIAL

## 2021-08-09 LAB
ANION GAP SERPL CALCULATED.3IONS-SCNC: 4 MMOL/L (ref 3–14)
BUN SERPL-MCNC: 17 MG/DL (ref 7–30)
CALCIUM SERPL-MCNC: 9.1 MG/DL (ref 8.5–10.1)
CHLORIDE BLD-SCNC: 107 MMOL/L (ref 94–109)
CO2 SERPL-SCNC: 27 MMOL/L (ref 20–32)
CREAT SERPL-MCNC: 0.93 MG/DL (ref 0.66–1.25)
ERYTHROCYTE [DISTWIDTH] IN BLOOD BY AUTOMATED COUNT: 12.5 % (ref 10–15)
GFR SERPL CREATININE-BSD FRML MDRD: 88 ML/MIN/1.73M2
GLUCOSE BLD-MCNC: 122 MG/DL (ref 70–99)
GLUCOSE BLDC GLUCOMTR-MCNC: 111 MG/DL (ref 70–99)
GLUCOSE BLDC GLUCOMTR-MCNC: 120 MG/DL (ref 70–99)
GLUCOSE BLDC GLUCOMTR-MCNC: 127 MG/DL (ref 70–99)
GLUCOSE BLDC GLUCOMTR-MCNC: 131 MG/DL (ref 70–99)
GLUCOSE BLDC GLUCOMTR-MCNC: 140 MG/DL (ref 70–99)
GLUCOSE BLDC GLUCOMTR-MCNC: 152 MG/DL (ref 70–99)
GLUCOSE BLDC GLUCOMTR-MCNC: 203 MG/DL (ref 70–99)
HCT VFR BLD AUTO: 45.9 % (ref 40–53)
HGB BLD-MCNC: 15.3 G/DL (ref 13.3–17.7)
MCH RBC QN AUTO: 29.9 PG (ref 26.5–33)
MCHC RBC AUTO-ENTMCNC: 33.3 G/DL (ref 31.5–36.5)
MCV RBC AUTO: 90 FL (ref 78–100)
PLATELET # BLD AUTO: 255 10E3/UL (ref 150–450)
POTASSIUM BLD-SCNC: 3.9 MMOL/L (ref 3.4–5.3)
RBC # BLD AUTO: 5.12 10E6/UL (ref 4.4–5.9)
SODIUM SERPL-SCNC: 138 MMOL/L (ref 133–144)
WBC # BLD AUTO: 8 10E3/UL (ref 4–11)

## 2021-08-09 PROCEDURE — 0BP1XDZ REMOVAL OF INTRALUMINAL DEVICE FROM TRACHEA, EXTERNAL APPROACH: ICD-10-PCS | Performed by: THORACIC SURGERY (CARDIOTHORACIC VASCULAR SURGERY)

## 2021-08-09 PROCEDURE — 250N000013 HC RX MED GY IP 250 OP 250 PS 637: Performed by: PHYSICIAN ASSISTANT

## 2021-08-09 PROCEDURE — 250N000025 HC SEVOFLURANE, PER MIN: Performed by: THORACIC SURGERY (CARDIOTHORACIC VASCULAR SURGERY)

## 2021-08-09 PROCEDURE — 250N000011 HC RX IP 250 OP 636: Performed by: NURSE ANESTHETIST, CERTIFIED REGISTERED

## 2021-08-09 PROCEDURE — 120N000002 HC R&B MED SURG/OB UMMC

## 2021-08-09 PROCEDURE — 250N000011 HC RX IP 250 OP 636: Performed by: ANESTHESIOLOGY

## 2021-08-09 PROCEDURE — 36415 COLL VENOUS BLD VENIPUNCTURE: CPT

## 2021-08-09 PROCEDURE — 250N000011 HC RX IP 250 OP 636: Performed by: THORACIC SURGERY (CARDIOTHORACIC VASCULAR SURGERY)

## 2021-08-09 PROCEDURE — 80048 BASIC METABOLIC PNL TOTAL CA: CPT

## 2021-08-09 PROCEDURE — 258N000003 HC RX IP 258 OP 636: Performed by: ANESTHESIOLOGY

## 2021-08-09 PROCEDURE — 250N000009 HC RX 250: Performed by: NURSE ANESTHETIST, CERTIFIED REGISTERED

## 2021-08-09 PROCEDURE — 999N000141 HC STATISTIC PRE-PROCEDURE NURSING ASSESSMENT: Performed by: THORACIC SURGERY (CARDIOTHORACIC VASCULAR SURGERY)

## 2021-08-09 PROCEDURE — 710N000010 HC RECOVERY PHASE 1, LEVEL 2, PER MIN: Performed by: THORACIC SURGERY (CARDIOTHORACIC VASCULAR SURGERY)

## 2021-08-09 PROCEDURE — 0DJ08ZZ INSPECTION OF UPPER INTESTINAL TRACT, VIA NATURAL OR ARTIFICIAL OPENING ENDOSCOPIC: ICD-10-PCS | Performed by: THORACIC SURGERY (CARDIOTHORACIC VASCULAR SURGERY)

## 2021-08-09 PROCEDURE — 43235 EGD DIAGNOSTIC BRUSH WASH: CPT | Mod: 78 | Performed by: THORACIC SURGERY (CARDIOTHORACIC VASCULAR SURGERY)

## 2021-08-09 PROCEDURE — 85027 COMPLETE CBC AUTOMATED: CPT

## 2021-08-09 PROCEDURE — 250N000011 HC RX IP 250 OP 636: Performed by: STUDENT IN AN ORGANIZED HEALTH CARE EDUCATION/TRAINING PROGRAM

## 2021-08-09 PROCEDURE — 360N000082 HC SURGERY LEVEL 2 W/ FLUORO, PER MIN: Performed by: THORACIC SURGERY (CARDIOTHORACIC VASCULAR SURGERY)

## 2021-08-09 PROCEDURE — 370N000017 HC ANESTHESIA TECHNICAL FEE, PER MIN: Performed by: THORACIC SURGERY (CARDIOTHORACIC VASCULAR SURGERY)

## 2021-08-09 PROCEDURE — 250N000013 HC RX MED GY IP 250 OP 250 PS 637

## 2021-08-09 PROCEDURE — 272N000001 HC OR GENERAL SUPPLY STERILE: Performed by: THORACIC SURGERY (CARDIOTHORACIC VASCULAR SURGERY)

## 2021-08-09 PROCEDURE — C9113 INJ PANTOPRAZOLE SODIUM, VIA: HCPCS | Performed by: THORACIC SURGERY (CARDIOTHORACIC VASCULAR SURGERY)

## 2021-08-09 RX ORDER — SODIUM CHLORIDE, SODIUM LACTATE, POTASSIUM CHLORIDE, CALCIUM CHLORIDE 600; 310; 30; 20 MG/100ML; MG/100ML; MG/100ML; MG/100ML
INJECTION, SOLUTION INTRAVENOUS CONTINUOUS
Status: DISCONTINUED | OUTPATIENT
Start: 2021-08-09 | End: 2021-08-09 | Stop reason: HOSPADM

## 2021-08-09 RX ORDER — PROPOFOL 10 MG/ML
INJECTION, EMULSION INTRAVENOUS PRN
Status: DISCONTINUED | OUTPATIENT
Start: 2021-08-09 | End: 2021-08-09

## 2021-08-09 RX ORDER — LIDOCAINE HYDROCHLORIDE 20 MG/ML
INJECTION, SOLUTION INFILTRATION; PERINEURAL PRN
Status: DISCONTINUED | OUTPATIENT
Start: 2021-08-09 | End: 2021-08-09

## 2021-08-09 RX ORDER — AMINO AC/PROTEIN HYDR/WHEY PRO 10G-100/30
1 LIQUID (ML) ORAL 3 TIMES DAILY
Status: DISCONTINUED | OUTPATIENT
Start: 2021-08-09 | End: 2021-08-10 | Stop reason: HOSPADM

## 2021-08-09 RX ORDER — ONDANSETRON 4 MG/1
4 TABLET, ORALLY DISINTEGRATING ORAL EVERY 30 MIN PRN
Status: DISCONTINUED | OUTPATIENT
Start: 2021-08-09 | End: 2021-08-09 | Stop reason: HOSPADM

## 2021-08-09 RX ORDER — HALOPERIDOL 5 MG/ML
1 INJECTION INTRAMUSCULAR
Status: DISCONTINUED | OUTPATIENT
Start: 2021-08-09 | End: 2021-08-09 | Stop reason: HOSPADM

## 2021-08-09 RX ORDER — FENTANYL CITRATE 50 UG/ML
INJECTION, SOLUTION INTRAMUSCULAR; INTRAVENOUS PRN
Status: DISCONTINUED | OUTPATIENT
Start: 2021-08-09 | End: 2021-08-09

## 2021-08-09 RX ORDER — EPHEDRINE SULFATE 50 MG/ML
INJECTION, SOLUTION INTRAMUSCULAR; INTRAVENOUS; SUBCUTANEOUS PRN
Status: DISCONTINUED | OUTPATIENT
Start: 2021-08-09 | End: 2021-08-09

## 2021-08-09 RX ORDER — AMINO AC/PROTEIN HYDR/WHEY PRO 10G-100/30
1 LIQUID (ML) ORAL 2 TIMES DAILY
Status: DISCONTINUED | OUTPATIENT
Start: 2021-08-09 | End: 2021-08-09

## 2021-08-09 RX ORDER — MEPERIDINE HYDROCHLORIDE 25 MG/ML
12.5 INJECTION INTRAMUSCULAR; INTRAVENOUS; SUBCUTANEOUS EVERY 5 MIN PRN
Status: DISCONTINUED | OUTPATIENT
Start: 2021-08-09 | End: 2021-08-09 | Stop reason: HOSPADM

## 2021-08-09 RX ORDER — ONDANSETRON 2 MG/ML
4 INJECTION INTRAMUSCULAR; INTRAVENOUS EVERY 30 MIN PRN
Status: DISCONTINUED | OUTPATIENT
Start: 2021-08-09 | End: 2021-08-09 | Stop reason: HOSPADM

## 2021-08-09 RX ORDER — FENTANYL CITRATE 50 UG/ML
50 INJECTION, SOLUTION INTRAMUSCULAR; INTRAVENOUS EVERY 5 MIN PRN
Status: DISCONTINUED | OUTPATIENT
Start: 2021-08-09 | End: 2021-08-09 | Stop reason: HOSPADM

## 2021-08-09 RX ORDER — HYDROMORPHONE HCL IN WATER/PF 6 MG/30 ML
0.4 PATIENT CONTROLLED ANALGESIA SYRINGE INTRAVENOUS EVERY 5 MIN PRN
Status: DISCONTINUED | OUTPATIENT
Start: 2021-08-09 | End: 2021-08-09 | Stop reason: HOSPADM

## 2021-08-09 RX ORDER — DEXAMETHASONE SODIUM PHOSPHATE 4 MG/ML
INJECTION, SOLUTION INTRA-ARTICULAR; INTRALESIONAL; INTRAMUSCULAR; INTRAVENOUS; SOFT TISSUE PRN
Status: DISCONTINUED | OUTPATIENT
Start: 2021-08-09 | End: 2021-08-09

## 2021-08-09 RX ADMIN — LIDOCAINE HYDROCHLORIDE 100 MG: 20 INJECTION, SOLUTION INFILTRATION; PERINEURAL at 12:00

## 2021-08-09 RX ADMIN — PANTOPRAZOLE SODIUM 40 MG: 40 INJECTION, POWDER, FOR SOLUTION INTRAVENOUS at 08:08

## 2021-08-09 RX ADMIN — Medication 1 PACKET: at 19:47

## 2021-08-09 RX ADMIN — FENTANYL CITRATE 50 MCG: 50 INJECTION, SOLUTION INTRAMUSCULAR; INTRAVENOUS at 12:00

## 2021-08-09 RX ADMIN — Medication 8 MCG: at 12:17

## 2021-08-09 RX ADMIN — Medication 10 MG: at 12:23

## 2021-08-09 RX ADMIN — MIDAZOLAM 2 MG: 1 INJECTION INTRAMUSCULAR; INTRAVENOUS at 11:52

## 2021-08-09 RX ADMIN — Medication 1 PACKET: at 09:22

## 2021-08-09 RX ADMIN — POTASSIUM CHLORIDE 20 MEQ: 20 SOLUTION ORAL at 08:10

## 2021-08-09 RX ADMIN — SODIUM CHLORIDE, POTASSIUM CHLORIDE, SODIUM LACTATE AND CALCIUM CHLORIDE: 600; 310; 30; 20 INJECTION, SOLUTION INTRAVENOUS at 10:46

## 2021-08-09 RX ADMIN — SODIUM CHLORIDE, POTASSIUM CHLORIDE, SODIUM LACTATE AND CALCIUM CHLORIDE: 600; 310; 30; 20 INJECTION, SOLUTION INTRAVENOUS at 11:52

## 2021-08-09 RX ADMIN — BACITRACIN ZINC: 500 OINTMENT TOPICAL at 08:08

## 2021-08-09 RX ADMIN — Medication 12 MCG: at 12:00

## 2021-08-09 RX ADMIN — CHLORHEXIDINE GLUCONATE 0.12% ORAL RINSE 15 ML: 1.2 LIQUID ORAL at 19:43

## 2021-08-09 RX ADMIN — ONDANSETRON 4 MG: 2 INJECTION INTRAMUSCULAR; INTRAVENOUS at 12:06

## 2021-08-09 RX ADMIN — CHLORHEXIDINE GLUCONATE 0.12% ORAL RINSE 15 ML: 1.2 LIQUID ORAL at 08:10

## 2021-08-09 RX ADMIN — ANTISEPTIC SURGICAL SCRUB: 0.04 SOLUTION TOPICAL at 19:46

## 2021-08-09 RX ADMIN — PROPOFOL 150 MG: 10 INJECTION, EMULSION INTRAVENOUS at 12:00

## 2021-08-09 RX ADMIN — BACITRACIN ZINC: 500 OINTMENT TOPICAL at 19:45

## 2021-08-09 RX ADMIN — FENTANYL CITRATE 50 MCG: 50 INJECTION, SOLUTION INTRAMUSCULAR; INTRAVENOUS at 12:15

## 2021-08-09 RX ADMIN — Medication 8 MCG: at 12:41

## 2021-08-09 RX ADMIN — Medication 1 PACKET: at 19:51

## 2021-08-09 RX ADMIN — POTASSIUM CHLORIDE 20 MEQ: 20 SOLUTION ORAL at 19:43

## 2021-08-09 RX ADMIN — ROCURONIUM BROMIDE 50 MG: 10 INJECTION INTRAVENOUS at 12:00

## 2021-08-09 RX ADMIN — DEXAMETHASONE SODIUM PHOSPHATE 4 MG: 4 INJECTION, SOLUTION INTRA-ARTICULAR; INTRALESIONAL; INTRAMUSCULAR; INTRAVENOUS; SOFT TISSUE at 12:04

## 2021-08-09 RX ADMIN — ANTISEPTIC SURGICAL SCRUB: 0.04 SOLUTION TOPICAL at 10:27

## 2021-08-09 RX ADMIN — SUGAMMADEX 200 MG: 100 INJECTION, SOLUTION INTRAVENOUS at 12:39

## 2021-08-09 RX ADMIN — Medication 1 PACKET: at 15:26

## 2021-08-09 RX ADMIN — ENOXAPARIN SODIUM 40 MG: 100 INJECTION SUBCUTANEOUS at 06:36

## 2021-08-09 ASSESSMENT — ACTIVITIES OF DAILY LIVING (ADL)
ADLS_ACUITY_SCORE: 17

## 2021-08-09 NOTE — DISCHARGE SUMMARY
NAME: Janet Urias   MRN: [id number]   : 1960     DATE OF ADMISSION: 2021     PRE/POSTOPERATIVE DIAGNOSES: Esophageal perforation found to be esophageal laceration    CODING ADDENDUM:  Mediastinitis ruled out    PROCEDURES PERFORMED:    EGD, GJ tube placement, pharyngoscopy, removal of foreign body, esophogram, and bilateral chest tube placement.     : EGD w/ removal of pharyngostomy tube    INTRAOPERATIVE COMPLICATIONS: None     POSTOPERATIVE COMPLICATIONS: None    DRAINS/TUBES PRESENT AT DISCHARGE: None    DATE OF DISCHARGE:  8/10/2021    HOSPITAL COURSE: Janet Urias is a 61 year old male who on 2021 underwent the above-named procedures.  He tolerated the operations well and postoperatively was transferred to the intermediate care unit before transferred again to the general post-surgical unit.  The remainder of his course was essentially uncomplicated. Both chest tubes and the pharyngostomy tube were removed by the time of discharge. Small laceration remained on follow up EGD, patient will discharge on ice chips and TF for nutrition. Prior to discharge, his pain was controlled well, he was able to perform ADLs and ambulate independently without difficulty, and had full return of bowel and bladder function.  On 8/10/21, he was discharged to home in stable condition.    DISCHARGE EXAM:   A&O, NAD, non-toxic  Resp non-labored, breathing comfortably on room air  Cards: RRR by radial pulse  Distal extremities warm  Incisions: Clean, Dry, Intact. No obvious hematomas    DISCHARGE INSTRUCTIONS:  Discharge Procedure Orders   XR Esophagram   Standing Status: Future Standing Exp. Date: 08/10/22     Order Specific Question Answer Comments   Priority Routine      Reason for your hospital stay   Order Comments: Esophageal injury     Tubes and drains   Order Comments: G-J TUBE INSTRUCTIONS:    G TUBE: For as needed venting  -You should vent or temporarily put your tube to gravity bag (or basin) drainage  if you experience nausea or bloating.  This will help to prevent reflux and vomiting.   If you are uncertain how to do this, please ask your nurse for instruction.    JTUBE: For tube feedings    Skin care:  -Change the gauze dressing around your tube daily.  -Check for redness and swelling in the area where the tube goes into your skin.    -Keep the skin around your tube dry and with a split gauze under the flange. If the hole where the tube enters your body is draining fluid, you may need to put barrier cream or antibiotic cream on the skin around your tube after you are done cleaning it. Cover the area with bandages, and call your caregiver.   -If there are no stitches holding your tube in place on your skin, gently turn the tube. This may decrease pressure on your skin, and help prevent an infection. Ask your caregiver if you should turn your tube, and how to do it correctly.     Flushes:  -Flush both the G and J tube ports with 30cc of water twice daily to ensure they do not become plugged  -After medications or tube feedings, make sure to flush with water immediately after use to prevent the tube from plugging     Discharge Instructions   Order Comments: THORACIC SURGERY DISCHARGE INSTRUCTIONS    DIET: NPO, nutrition via tube feeds    Medications via J-port on feeding tube    If your plans upon discharge include prolonged periods of sitting (i.e a lengthy car or plane ride), it is highly beneficial to get up and walk at least once per hour to help prevent swelling and blood clots.     You may get incision wet 2 days after operation. Do not submerge, soak, or scrub incision or swim until seen in follow-up.    Take incentive spirometer home for continued frequent use    Activity as tolerated, no strenous activity until seen in follow-up, no lifting greater than 20 pounds for the next 2 weeks.    Stay hydrated. Take over the counter fiber (metamucil or benefiber) and stool softeners (Miralax, docusate or senna) if  "becoming constipated.     Call for fever greater than 101.5, chills, increased size of incision, red skin around incision, vision changes, muscle strength changes, sensation changes, shortness of breath, or other concerns.    No driving while taking narcotic pain medication.    Transition to ibuprofen or tylenol/acetaminophen for pain control. Do not take tylenol/acetaminophen and acetaminophen containing narcotic (e.g., percocet or vicodin) at the same time. If you have known ulcer problems, or kidney trouble (elevated creatinine) do not take the ibuprofen.    In emergencies, call [phone]    For other Questions or Concerns;   A.) During weekday working hours (Monday through Friday 8am to 4:30pm)   call [phone] and ask to speak to a clinical nurse specialist.     B.) At nights (after 4:30pm), on weekends, or if urgent call [phone] and   tell the  \"I would like to page job code [phone], the thoracic surgery   fellow on call, please.\"     Adult [facility] Follow-up and recommended labs and tests   Order Comments: 1.) Follow up with primary care physician, Sonu Jones, in 1-2 weeks.  2.) Follow up with Dr. Howard in Thoracic Surgery clinic in 1 week, prior to which an esophagram should be performed.      Appointments on [place] and/or [facility] (with [facility] or [facility] provider or service). Call [phone] if you haven't heard regarding these appointments within 7 days of discharge.     Adult Formula Drip Feeding   Order Comments: Osmolite 1.5 @ 105 ml/hr x 16 hr (8pm to 2pm).  Pt can continue to increase rate and decrease run time if pt tolerates.   - Water flushes plus sips to equal at least 1270 ml.   As likely won't have feed and flush pump, recommend 320 ml QID (200 ml via G/120 ml via J port).  -Include 3 packets Prosource and Nutrisource Fiber (recommend increase to 6 packets if loose stools continue)       DISCHARGE MEDICATIONS:   Current Discharge Medication List      START taking " these medications    Details   acetaminophen (TYLENOL) 160 MG/5ML solution Take 31.25 mLs (1,000 mg) by mouth every 6 hours as needed for fever or mild pain  Qty: 1000 mL, Refills: 0    Associated Diagnoses: Esophageal laceration      amoxicillin-clavulanate (AUGMENTIN) 400-57 MG/5ML suspension 10.9 mLs (875 mg) by Per J Tube route every 12 hours for 14 days  Qty: 305.2 mL, Refills: 0    Associated Diagnoses: Esophageal laceration      Guar Gum (FIBER MODULAR, NUTRISOURCE FIBER,) packet 1 packet by Per Feeding Tube route 3 times daily  Qty: 90 packet, Refills: 0    Associated Diagnoses: Esophageal laceration      lisinopril (PRINIVIL,ZESTRIL) 1 MG/ML suspension 10 mLs (10 mg) by Per J Tube route daily for 14 days  Qty: 140 mL, Refills: 0    Associated Diagnoses: Esophageal laceration      oxyCODONE (ROXICODONE) 5 MG/5ML solution 5 mLs (5 mg) by Per J Tube route every 4 hours as needed for moderate to severe pain  Qty: 100 mL, Refills: 0    Associated Diagnoses: Esophageal laceration      polyethylene glycol (MIRALAX) 17 g packet Take 17 g by mouth daily  Qty: 7 packet, Refills: 0    Comments: Continue until no longer requiring narcotic pain medications  Associated Diagnoses: Esophageal laceration      Sennosides (SENNA) 8.8 MG/5ML SYRP 5 mLs (8.8 mg) by Jejunal Tube route 2 times daily  Qty: 70 mL, Refills: 0    Comments: Continue until no longer requiring narcotic pain medications  Associated Diagnoses: Esophageal laceration         CONTINUE these medications which have NOT CHANGED    Details   protein modular (PROSOURCE TF) LIQD 1 packet by Per Feeding Tube route 3 times daily  Qty: 90 packet, Refills: 0    Associated Diagnoses: Esophageal laceration         STOP taking these medications       lisinopril-hydrochlorothiazide (ZESTORETIC) 10-12.5 MG tablet Comments:   Reason for Stopping:

## 2021-08-09 NOTE — ANESTHESIA PROCEDURE NOTES
Airway       Patient location during procedure: OR       Procedure Start/Stop Times: 8/9/2021 12:06 PM  Staff -        CRNA: Sandra Kent APRN CRNA       Performed By: CRNA  Consent for Airway        Urgency: elective  Indications and Patient Condition       Indications for airway management: johan-procedural         Mask difficulty assessment: 2 - vent by mask + OA or adjuvant +/- NMBA    Final Airway Details       Final airway type: endotracheal airway       Successful airway: ETT - single  Endotracheal Airway Details        ETT size (mm): 8.0       Cuffed: yes       Successful intubation technique: direct laryngoscopy       DL Blade Type: MAC 4       Grade View of Cords: 1       Adjucts: stylet       Position: Right       Measured from: lips       Secured at (cm): 23       Bite block used: None    Post intubation assessment        Number of attempts at approach: 1       Number of other approaches attempted: 0       Secured with: pink tape       Ease of procedure: easy       Dentition: Intact and Unchanged

## 2021-08-09 NOTE — ANESTHESIA POSTPROCEDURE EVALUATION
Patient: Janet Urias    Procedure(s):  ESOPHAGOGASTRODUODENOSCOPY (EGD)  PHARYNGOSTOMY tube removal    Diagnosis:Esophageal laceration [K22.8]  Diagnosis Additional Information: No value filed.    Anesthesia Type:  General    Note:  Disposition: Inpatient   Postop Pain Control: Uneventful            Sign Out: Well controlled pain   PONV: No   Neuro/Psych: Uneventful            Sign Out: Acceptable/Baseline neuro status   Airway/Respiratory: Uneventful            Sign Out: Acceptable/Baseline resp. status   CV/Hemodynamics: Uneventful            Sign Out: Acceptable CV status   Other NRE: NONE   DID A NON-ROUTINE EVENT OCCUR? No           Last vitals:  Vitals Value Taken Time   /94 08/09/21 1320   Temp 36.3  C (97.4  F) 08/09/21 1315   Pulse 76 08/09/21 1328   Resp 14 08/09/21 1315   SpO2 95 % 08/09/21 1328   Vitals shown include unvalidated device data.    Electronically Signed By: Susi Martinez MD  August 9, 2021  1:29 PM

## 2021-08-09 NOTE — ANESTHESIA POSTPROCEDURE EVALUATION
Patient: Janet Urias    Procedure(s):  Esophagogastroduodenoscopy, GJ tube placement, pharyngoscopy, removal of foreign body, esophogram, and bilateral chest tube placement    Diagnosis:Esophageal perforation [K22.3]  Diagnosis Additional Information: No value filed.    Anesthesia Type:  General    Note:  Disposition: Outpatient   Postop Pain Control: Uneventful            Sign Out: Well controlled pain   PONV: No   Neuro/Psych: Uneventful            Sign Out: Acceptable/Baseline neuro status   Airway/Respiratory: Uneventful            Sign Out: Acceptable/Baseline resp. status   CV/Hemodynamics: Uneventful            Sign Out: Acceptable CV status   Other NRE: NONE   DID A NON-ROUTINE EVENT OCCUR? No           Last vitals:  Vitals Value Taken Time   /97 08/04/21 1400   Temp 36.6  C (97.8  F) 08/04/21 1400   Pulse 84 08/04/21 1400   Resp 16 08/04/21 1400   SpO2 97 % 08/04/21 1400       Electronically Signed By: Christopher J. Behrens, MD  August 9, 2021  2:10 PM

## 2021-08-09 NOTE — PROGRESS NOTES
Home Infusion    Janet is expected to discharge tomorrow and will be going home on cycled enteral feeds.  He has never done home enteral therapy before however he and his wife Deb have an appt with [facility] nurse tomorrow for some initial teaching.  Benefits have been checked and Bridgette will have coverage through their [facility] policy subject to meeting his deductible.    Met with Janet at bedside to relay benefit information and offer choice of agency for the enteral supplies.  He stated he would like to keep his serviced within the [facility] network so would like to use [facility].  Povided him with information about I services.  Explained about administration method via the Infinity pump with back pack for mobility.   Assessed for supply needs and informed hiim about delivery of supplies, storage of formula, plan for SNV and 24/7 availability of I staff while on enteral therapy.   [facility] ([place] [phone]) will be providing home nursing services and any therapies needed.  I will need to deliver formula and supplies to the hospital on day of discharge and a require ~ 4hr turnaround time for that from when discharge orders are signed.    Janet verbalized understanding of all information given and is willing and able to learn and manage home enteral therapy.    Questions answered.  Will continue to follow and update pt with more details as appropriate.      Rosetta BRADFORD  Nurse Liaison  [facility] I [email or web address]  [address] [place] [address]  [email or web address]  [phone] M-F I [facility] main: [phone]

## 2021-08-09 NOTE — BRIEF OP NOTE
[facility]    Brief Operative Note    Pre-operative diagnosis: Esophageal laceration [K22.8]  Post-operative diagnosis Same as pre-operative diagnosis    Procedure: Procedure(s):  ESOPHAGOGASTRODUODENOSCOPY (EGD)  PHARYNGOSTOMY tube removal  Surgeon: Surgeon(s) and Role:     * Pool Aranda MD - Primary  Anesthesia: General   Estimated blood loss: None  Drains: None  Specimens: * No specimens in log *  Findings:   Healing esophageal laceration.  Complications: None.  Implants: * No implants in log *

## 2021-08-09 NOTE — PROGRESS NOTES
Care Management Follow Up    Length of Stay (days): 5    Expected Discharge Date: 08/10/2021     Concerns to be Addressed: discharge planning     Patient plan of care discussed at interdisciplinary rounds: Yes    Anticipated Discharge Disposition: Home     Anticipated Discharge Services: Home Infusion  Anticipated Discharge DME: None    Patient/family educated on Medicare website which has current facility and service quality ratings: no  Education Provided on the Discharge Plan: yes   Patient/Family in Agreement with the Plan: yes    Referrals Placed by CM/SW: External Care Coordination  Private pay costs discussed: Not applicable    Additional Information:  Per MD team patient is anticipated to be medically ready for discharge to home tomorrow with tube feedings.  PLC arranged for tomorrow at 10am for tube feeding education.  Updated patient with this information.  He is agreeable to this plan and motivated to discharge tomorrow.  Patient will need delivery of supplies to his bedside prior to discharge tomorrow.  RNCC will continue to follow and assist with discharge planning as needed.        Gayle Elias RNCC  Phone: [phone]  Pager: [phone]  To contact the weekend RNCC, Page: [phone]

## 2021-08-09 NOTE — PLAN OF CARE
"BP (!) 160/103 (BP Location: Left arm)   Pulse 84   Temp 98.6  F (37  C) (Oral)   Resp 18   Ht 1.88 m (6' 2\")   Wt 102.1 kg (225 lb)   SpO2 97%   BMI 28.89 kg/m      VSS, BP high but within parameters,  , g-tube to gravity, j-tube with tube feeds running at 70 ml/hr since 0630, pharyngostomy to LIS with 225 ml of yellow output, voiding not saving, no complaints of pain, L PIV TKO, plan for EGD this afternoon  "

## 2021-08-09 NOTE — PLAN OF CARE
"BP (!) 143/92 (BP Location: Left arm)   Pulse 70   Temp 98  F (36.7  C) (Oral)   Resp 18   Ht 1.88 m (6' 2\")   Wt 102.1 kg (225 lb)   SpO2 95%   BMI 28.89 kg/m        Neuro: A&Ox4; calls appropriately.   Respiratory: sats mid 90s on RA. On continuous capnography.   Cardiac: hypertensive but within parameters. PRN labetalol avail for SBP >160.   GI/: hypoactive BS, no BM. Voiding not saving.   Diet: NPO except ice chips. Per team, OK to have 1 cup of ice chip per 8 hour shift.    Pain/nausea: Denies pain and nausea  Incision/Drains: Old pharyngostomy site on (L) side of neck. ZORA, no drainage.  G tube- to gravity.  J tube with cycled TFs and meds.   IV Access: (R) PIV- infusing LR at 25 mL/hr.   Activity: SBA     Plan: Tube feeds currently running at 85 mL/hr. Next advancement if pt tolerates to 100 mL/hr at 0230. Continue with POC  "

## 2021-08-09 NOTE — PROGRESS NOTES
Thoracic Surgery Progress Note  08/09/2021     Subjective:  No acute events.Continues to improve and feels better today. Was able to get some sleep. Tolerating tube feeds. Continues to pass flats and 1x BM since yesterday less loose than before. Ambulating.      Objective:  Temp:  [97.6  F (36.4  C)-98.6  F (37  C)] 97.8  F (36.6  C)  Pulse:  [69-88] 88  Resp:  [16-18] 16  BP: (147-161)/() 151/98  SpO2:  [97 %-100 %] 100 %    I/O last 3 completed shifts:  In: 2966 [Other:60; NG/GT:2011]  Out: 525 [Emesis/NG output:200; Drains:325]      Gen: Awake, alert, NAD  Resp: NLB on NC  Abd: soft, nondistended, nontender  Incision(s): c/d/I  Ext: WWP, no edema  Pharyngostomy tube Out: 250/unrecorded       Labs:  Recent Labs   Lab 08/09/21  0731 08/08/21  0810 08/07/21  0803   WBC 8.0 7.0 7.1   HGB 15.3 14.3 14.0    232 220       Recent Labs   Lab 08/09/21  0753 08/09/21  0731 08/09/21  0013 08/08/21  0810 08/07/21  0803 08/06/21  1132 08/05/21  1020   NA  --  138  --  142 145*  --  143   POTASSIUM  --  3.9  --  3.7 3.7 4.0 3.6   CHLORIDE  --  107  --  110* 113*  --  109   CO2  --  27  --  27 28  --  27   BUN  --  17  --  19 18  --  20   CR  --  0.93  --  0.91 0.87  --  0.92   * 122* 140* 122* 130*  --  178*   ANA  --  9.1  --  8.9 8.9  --  8.9   MAG  --   --   --   --  2.4* 2.4* 2.4*   PHOS  --   --   --   --  3.2 2.5 2.0*     Cx: NGTD    Imaging:  None     Assessment/Plan:   61 year old male with concern for esophageal perforation now s/p EGD, GJ placement, pharyngostomy tube placement and foreign body removal. Found to have long, vertical esophageal laceration without concern for perforation on on-table esophagram. Post-operatively issues with agitation requiring precedex gtt now resolved. Doing well, no acute concerns.     - EGD today with Dr. Howard. +/- Pharyngostomy removal  - Hold TFs for procedure  - Continue tylenol, oxycodone via J-tube  - Strict NPO, pharyngostomy tube to LIS  - G tube to gravity,  J tube for feeds and meds  - Nutrition will discuss TF goals with nursing, continue fiber  - PT/OT, OOB, ambulate  - Lovenox for DVT ppx  - Discharge planning: will likely need TF teaching and home care     Seen, examined, and discussed with fellow who will discuss with staff    Walker Corbett MD  PGY-1, General Surgery

## 2021-08-09 NOTE — PROGRESS NOTES
CLINICAL NUTRITION SERVICES - BRIEF NOTE     Nutrition Prescription    RECOMMENDATIONS FOR MDs/PROVIDERS TO ORDER:  --At discharge will need to include 3 packets Prosource and Nutrisource Fiber (recommend increase to 6 packets if loose stools continue) along with 1680 ml (7 cans) of Osmolite 1.5 @ 105 ml/hr x 16 hr.  Pt can continue to increase rate and decrease run time if pt tolerates.   - Water flushes plus sips to equal at least 1270 ml.   As likely won't have feed and flush pump, recommend 320 ml QID (200 ml via G/120 ml via J port).     Recommendations already ordered by Registered Dietitian (RD):  - Continue Nutrisource fiber--1 packet 3/day.  - Add Prosource packet TID  - Resume Osmolite 1.5 @ 85 ml/hr x 8 hr.  If tolerates increase to intermediate rate of 100 ml/hr.  Stop after 10 hr for total of 1680 ml/.    -  With Prosource and Nutrisource fiber will provide 2640 kcal (26 kcal/kg), 136 gm protein (1.3 gm/kg), 342 gm CHO, 9 gm fiber).     Future/Additional Recommendations:  - Follow tolerance of TF transition.  Adjust TF schedule based on pt's tolerance. Check blood sugars when at highest rate to check for any need for blood sugar coverage.   - If pt doesn't discharge Tuesday, could adjust to 105 ml/hr x 16 hr from 8pm to 2pm.  *correction 8pm-12pm (16 hrs) - Debbie Jackson, MS, RD, LD, CNSC     Discussed TF/diet advance with thoracic.  Per EGD results, pt will need to continue TF with only ice chips and sips of water for now.  Unclear when diet will advance beyond this. MD would like to transition to nocturnal rate and possibly discharge pt home tomorrow.     EVALUATION OF THE PROGRESS TOWARD GOALS   Diet: NPO with ice chips only.  Nutrition Support: TF was on hold for EGD. Osmolite 1.5 reached continuous goal rate of 70 ml/hr earlier this AM.    Nutrisource fiber 3 packets/day added 8/8 by MD for continued loose stools (as recommended by RD Friday if they continued after switch to Osmolite 1.5).  "  Water flushes of 100 ml q 2 hr (1200 ml/d).      NEW FINDINGS   Labs noted: 8/9 Na 138 (8/7: Na -145 w/ slow TF rate advance and loose stools 8/6), K+ 3.9.  Last Mg 2.4 8/7 and Phos 3.2.  Blood sugars generally < 150.     Rx noted: novolog ordered w/ meals (NPO + TF) and hs. He hasn't received \"meal\" novolog since 7/8 afternoon and prior dose was on 8/6.     GI: 3 loose stools noted on 8/8 (first partial day of nutrisource fiber). 8/9--diarrhea stool 9am this AM.     INTERVENTIONS  Education-- discussed TF rate transition with pt/daughter visiting.  Deferred questions re: diet advance after discharge to thoracic.  Enteral nutrition---adjust rate, add Prosource.     Monitoring/Evaluation  Progress toward goals will be monitored and evaluated per protocol.       "

## 2021-08-09 NOTE — OP NOTE
Procedure Date: 08/09/2021    PREOPERATIVE DIAGNOSIS:  History of esophageal perforation.    POSTOPERATIVE DIAGNOSIS:  History of esophageal perforation.    PROCEDURES PERFORMED:  1.  EGD.  2.  Pharyngostomy removal.    ANESTHESIA:  General endotracheal anesthesia.    SURGEON:  Pool Hameed MD    ASSISTANT:  Jerry Medina MD, General Surgery resident    OPERATIVE FINDINGS:  The previous esophageal tear appeared to be healing very well.  We copiously irrigated the esophagus.  Rest of the stomach appeared to be normal.  Given the significant progress and the patient's clinical status, we removed the pharyngostomy and we did not place an esophageal stent given the fact that the patient had a moderately dilated esophagus and there was no risk for a stricture.    DESCRIPTION OF PROCEDURE IN DETAIL:  The patient was taken to the operating room, laid supine.  General anesthesia was induced.  A formal timeout was carried out confirming the name of the patient and correct procedure.  He had SCDs in place and functioning prior to induction of anesthesia.    After the timeout was completed, we introduced an adult scope into the esophagus.  We carefully examined the mucosa.  We found the esophageal tear extending from 33 cm from the incisors to 41 on the left lateral side of the esophagus.  This tear appeared to be partial and appeared to be healing well.  We used 1 liter of saline to irrigate the area.  The rest of the stomach appeared to be normal and the gastrojejunostomy tube was in adequate position.  We then removed the pharyngostomy tube and removed the scope.  The patient tolerated the procedure well.  All instrument and sponge counts were correct at the end of the case.  The patient was then extubated and transferred to PACU for recovery.      Pool Hameed MD        D: 08/09/2021   T: 08/09/2021   MT: [id number]    Name:     BRITTANEY MACIAS  MRN:      [id number]        Account:        [id number]    :      1960           Procedure Date: 2021     Document: [id number]

## 2021-08-09 NOTE — ANESTHESIA CARE TRANSFER NOTE
Patient: Janet Urias    Procedure(s):  ESOPHAGOGASTRODUODENOSCOPY (EGD)  PHARYNGOSTOMY tube removal    Diagnosis: Esophageal laceration [K22.8]  Diagnosis Additional Information: No value filed.    Anesthesia Type:   General     Note:    Oropharynx: oropharynx clear of all foreign objects and spontaneously breathing  Level of Consciousness: awake  Oxygen Supplementation: face mask  Level of Supplemental Oxygen (L/min / FiO2): 6  Independent Airway: airway patency satisfactory and stable  Dentition: dentition unchanged  Vital Signs Stable: post-procedure vital signs reviewed and stable  Report to RN Given: handoff report given  Patient transferred to: PACU    Handoff Report: Identifed the Patient, Identified the Reponsible Provider, Reviewed the pertinent medical history, Discussed the surgical course, Reviewed Intra-OP anesthesia mangement and issues during anesthesia, Set expectations for post-procedure period and Allowed opportunity for questions and acknowledgement of understanding      Vitals:  Vitals Value Taken Time   /98 08/09/21 1248   Temp     Pulse 86 08/09/21 1254   Resp     SpO2 99 % 08/09/21 1254   Vitals shown include unvalidated device data.    Electronically Signed By: Gayle Vazquez  August 9, 2021  12:55 PM

## 2021-08-09 NOTE — PLAN OF CARE
Neuro: WDL  Respiratory: WDL on RA and capnography post EGD  Cardiac: WDL except elevated BP   GI/: Pt report BM today, voiding without difficulty  Diet: NPO except ice chips. Blood sugar q4 hrs.   Pain/nausea: Denies pain and nausea  Incision/Drains: Pharyngostomy tube removed and open to air. G tube- to gravity drain. J tube- TF and meds.   IV Access: L PIV saline locked   Activity: SBA with IV pole    New changes this shift: PRNs available for nausea and pain  Plan: Continue with POC

## 2021-08-09 NOTE — OR NURSING
Dr. Martinez notified patient ready to return to unit 7B.  Dr. Martinez stated she will enter anesthesia sign out.

## 2021-08-10 ENCOUNTER — APPOINTMENT (OUTPATIENT)
Dept: EDUCATION SERVICES | Facility: CLINIC | Age: 61
DRG: 327 | End: 2021-08-10
Payer: COMMERCIAL

## 2021-08-10 ENCOUNTER — HOME INFUSION (PRE-WILLOW HOME INFUSION) (OUTPATIENT)
Dept: PHARMACY | Facility: CLINIC | Age: 61
End: 2021-08-10

## 2021-08-10 VITALS
BODY MASS INDEX: 28.88 KG/M2 | OXYGEN SATURATION: 99 % | HEIGHT: 74 IN | WEIGHT: 225 LBS | TEMPERATURE: 97.9 F | HEART RATE: 90 BPM | SYSTOLIC BLOOD PRESSURE: 135 MMHG | RESPIRATION RATE: 18 BRPM | DIASTOLIC BLOOD PRESSURE: 89 MMHG

## 2021-08-10 LAB
GLUCOSE BLDC GLUCOMTR-MCNC: 126 MG/DL (ref 70–99)
GLUCOSE BLDC GLUCOMTR-MCNC: 131 MG/DL (ref 70–99)
GLUCOSE BLDC GLUCOMTR-MCNC: 181 MG/DL (ref 70–99)
GLUCOSE BLDC GLUCOMTR-MCNC: 181 MG/DL (ref 70–99)

## 2021-08-10 PROCEDURE — 250N000013 HC RX MED GY IP 250 OP 250 PS 637: Performed by: PHYSICIAN ASSISTANT

## 2021-08-10 PROCEDURE — 250N000011 HC RX IP 250 OP 636: Performed by: STUDENT IN AN ORGANIZED HEALTH CARE EDUCATION/TRAINING PROGRAM

## 2021-08-10 PROCEDURE — 250N000013 HC RX MED GY IP 250 OP 250 PS 637

## 2021-08-10 PROCEDURE — C9113 INJ PANTOPRAZOLE SODIUM, VIA: HCPCS | Performed by: THORACIC SURGERY (CARDIOTHORACIC VASCULAR SURGERY)

## 2021-08-10 PROCEDURE — 250N000011 HC RX IP 250 OP 636: Performed by: THORACIC SURGERY (CARDIOTHORACIC VASCULAR SURGERY)

## 2021-08-10 RX ORDER — POLYETHYLENE GLYCOL 3350 17 G/17G
17 POWDER, FOR SOLUTION ORAL DAILY
Qty: 7 PACKET | Refills: 0 | Status: SHIPPED | OUTPATIENT
Start: 2021-08-11

## 2021-08-10 RX ORDER — OXYCODONE HCL 5 MG/5 ML
5 SOLUTION, ORAL ORAL EVERY 4 HOURS PRN
Qty: 100 ML | Refills: 0 | Status: SHIPPED | OUTPATIENT
Start: 2021-08-10

## 2021-08-10 RX ORDER — GUAR GUM
1 PACKET (EA) ORAL 3 TIMES DAILY
Qty: 90 PACKET | Refills: 0 | Status: SHIPPED | OUTPATIENT
Start: 2021-08-10 | End: 2021-09-09

## 2021-08-10 RX ORDER — LISINOPRIL 10 MG/1
10 TABLET ORAL DAILY
Qty: 140 ML | Refills: 0 | Status: SHIPPED | OUTPATIENT
Start: 2021-08-10 | End: 2021-08-24

## 2021-08-10 RX ORDER — SENNOSIDES 8.8 MG/5ML
8.8 LIQUID ORAL 2 TIMES DAILY
Qty: 70 ML | Refills: 0 | Status: SHIPPED | OUTPATIENT
Start: 2021-08-10

## 2021-08-10 RX ORDER — AMINO AC/PROTEIN HYDR/WHEY PRO 10G-100/30
1 LIQUID (ML) ORAL 3 TIMES DAILY
Qty: 90 PACKET | Refills: 0 | Status: SHIPPED | OUTPATIENT
Start: 2021-08-10 | End: 2021-09-09

## 2021-08-10 RX ORDER — ACETAMINOPHEN 160 MG/5ML
1000 LIQUID ORAL EVERY 6 HOURS PRN
Qty: 1000 ML | Refills: 0 | Status: SHIPPED | OUTPATIENT
Start: 2021-08-10

## 2021-08-10 RX ORDER — AMOXICILLIN AND CLAVULANATE POTASSIUM 400; 57 MG/5ML; MG/5ML
875 POWDER, FOR SUSPENSION ORAL EVERY 12 HOURS
Qty: 305.2 ML | Refills: 0 | Status: SHIPPED | OUTPATIENT
Start: 2021-08-10 | End: 2021-08-24

## 2021-08-10 RX ADMIN — Medication 1 PACKET: at 08:32

## 2021-08-10 RX ADMIN — Medication 1 PACKET: at 08:38

## 2021-08-10 RX ADMIN — BACITRACIN ZINC: 500 OINTMENT TOPICAL at 08:33

## 2021-08-10 RX ADMIN — CHLORHEXIDINE GLUCONATE 0.12% ORAL RINSE 15 ML: 1.2 LIQUID ORAL at 08:33

## 2021-08-10 RX ADMIN — POTASSIUM CHLORIDE 20 MEQ: 20 SOLUTION ORAL at 08:32

## 2021-08-10 RX ADMIN — ENOXAPARIN SODIUM 40 MG: 100 INJECTION SUBCUTANEOUS at 05:54

## 2021-08-10 RX ADMIN — PANTOPRAZOLE SODIUM 40 MG: 40 INJECTION, POWDER, FOR SOLUTION INTRAVENOUS at 08:37

## 2021-08-10 RX ADMIN — CHLORHEXIDINE GLUCONATE 0.12% ORAL RINSE 15 ML: 1.2 LIQUID ORAL at 08:38

## 2021-08-10 ASSESSMENT — ACTIVITIES OF DAILY LIVING (ADL)
ADLS_ACUITY_SCORE: 17

## 2021-08-10 NOTE — PLAN OF CARE
"/89   Pulse 90   Temp 97.9  F (36.6  C) (Oral)   Resp 18   Ht 1.88 m (6' 2\")   Wt 102.1 kg (225 lb)   SpO2 99%   BMI 28.89 kg/m     AVSS.    Neuro: Alert and oriented x4.     GI/: WDL. Voiding and reports loose stools. GT to gravity. JT with feeding.     Diet: NPO with tube feeding.     Incisions/Drains: GT to gravity. Dressing to both old right and left CT sites changed.     IV Access: PIV - to be removed at discharge time.     Labs: BG = 131.     Activity: Independent.     Pain: No c/o pain.     New changes this shift: PLC at 10am. Teaching on tube feeding and medication administration ongoing. Supplies for home tube feeding will be delivered to pt's room before discharge. Rx x 8 will be ready in discharge pharmacy at 1130. Discussed discharge instructions with pt. Print out of discharge instructions given to pt.     Plan: Discharge home with tube feeding and supplies today.   "

## 2021-08-10 NOTE — CONSULTS
Janet his wife and son were seen in the [facility] for tube feeding education. Janet did well with all skills for GJ tube and use of the infinity pump for cycled feeds. They asked good questions and stated understanding of all information.    Literature given: Handwashing and Skin Care, Caring for Your Tube at Home. Using the Enteralite Infinity Pump for Tube Feeding,

## 2021-08-10 NOTE — PLAN OF CARE
Pt back from [facility].   Seen by primary team.  Teaching completed.  GT will be clamped at 1230.   Vent GT if nausea. Gave a bag if needed for venting GT @ home.  Rx being picked by family.   Waiting for supplies for tube feeding to be delivered to pt.   Stable. Ready to discharge home.

## 2021-08-10 NOTE — DISCHARGE INSTRUCTIONS
Osmolite 1.5 @ 100 ml/hr x 18 hours (6:30PM -12:30PM), 1800 ml, 2700 kcals, 113 g pro, 1370 ml free water   + 3 pkts prosource: 2820 kcals (28 kcals/kg), 146 g pro ( 1.4 g/kg pro)  + 1 packet Nutrisource fiber TID ( total of 9 g soluble fiber)   -- water flushes: 100 ml Q2 hours for a total of 1200 ml free water from flushes + 1320 ml free water from formula. If pump not available at home, RD to recommend: 300 ml via Gtube QID for a total of 1200 ml.

## 2021-08-10 NOTE — PROGRESS NOTES
Care Management Discharge Note    Discharge Date: 08/10/2021       Discharge Disposition: Home    Discharge Services: Home Infusion     Discharge DME: Tube feedings supplies    Discharge Transportation: family or friend will provide    Private pay costs discussed: Not applicable    PAS Confirmation Code: NA   Patient/family educated on Medicare website which has current facility and service quality ratings: no    Education Provided on the Discharge Plan: Yes   Persons Notified of Discharge Plans: Patient  Patient/Family in Agreement with the Plan: yes    Handoff Referral Completed: External     Additional Information:  Per MD team patient is medically ready for discharge to home today on tube feedings.  Patient had PLC this am for education on tube feeding administration.  [facility] to deliver tube feedings and supplies to bedside prior to discharge to home.  Patient to f/u in clinic in 1 week.  [facility] to provide transportation to home.  RNCC will remain available if further needs arise.      [facility](tube feedings)  Phone: [phone]  Fax: [phone]      JESSICA Tellez  Phone: [phone]  Pager: [phone]  To contact the weekend RNCC, Page: [phone]

## 2021-08-10 NOTE — PROGRESS NOTES
No acute concerns overnight, patient is hoping to be discharged today.     Neuro: A/o male, PERRLA, sensation and motor strength intact.     Resp: On RA, oxygen maitaining on RA, denies SOB at rest and on exertion.     Cardio: Blood pressure in 140s,  regular pulses     GI: Denies nausea, loose BM *2  Overnight. Active bowel sounds. TF at J tube currently running @ 100 mls/hr. With 100 Q 2 flushes, tolerating TF well with no concern.  Will complete 10 hrs of 100 mls TF cycle at 1030. G tube to gravity bag with 350 mls output overnight.     : Up voiding in bathroom with no difficulty     Mobility: Ambulates independently with no concerns     Access:  PIV to right arm     Skin:  Left neck  Old pharysgostomy site, c/d/i with no drainage. Blister and skin tear from tegaderm use to obi lateral chest areas.     Plan: Possible discharge home today, after wife and son completes educational session on home tube feeding.

## 2021-08-10 NOTE — PROGRESS NOTES
CLINICAL NUTRITION SERVICES - BRIEF NOTE    RD to clarify discharge cycled TF orders in notes and ensure they are correct in current orders and discharge instructions. Pt tolerating currently EN cycled regimen at goal.     INTERVENTIONS  Implementation  Patient to discharge home on following EN regimen:  Osmolite 1.5 @ 100 ml/hr x 18 hours (6:30PM -12:30PM), 1800 ml, 2700 kcals, 113 g pro, 1370 ml free water   + 3 pkts prosource: 2820 kcals (28 kcals/kg), 146 g pro ( 1.4 g/kg pro)  + 1 packet Nutrisource fiber TID ( total of 9 g soluble fiber)   -- water flushes: 100 ml Q2 hours for a total of 1200 ml free water from flushes + 1320 ml free water from formula. If pump not available at home, RD to recommend: 300 ml via Gtube QID for a total of 1200 ml.        Ping Bates RD, MS, LD  SICU: [id number]

## 2021-08-11 ENCOUNTER — HOME INFUSION (PRE-WILLOW HOME INFUSION) (OUTPATIENT)
Dept: PHARMACY | Facility: CLINIC | Age: 61
End: 2021-08-11

## 2021-08-11 LAB
BACTERIA BLD CULT: NO GROWTH
BACTERIA BLD CULT: NO GROWTH

## 2021-08-17 ENCOUNTER — ANCILLARY PROCEDURE (OUTPATIENT)
Dept: GENERAL RADIOLOGY | Facility: CLINIC | Age: 61
End: 2021-08-17
Attending: PHYSICIAN ASSISTANT
Payer: COMMERCIAL

## 2021-08-17 DIAGNOSIS — K22.89: ICD-10-CM

## 2021-08-17 PROCEDURE — 74220 X-RAY XM ESOPHAGUS 1CNTRST: CPT | Mod: GC | Performed by: RADIOLOGY

## 2021-08-18 ENCOUNTER — HOME INFUSION (PRE-WILLOW HOME INFUSION) (OUTPATIENT)
Dept: PHARMACY | Facility: CLINIC | Age: 61
End: 2021-08-18

## 2021-08-18 ENCOUNTER — VIRTUAL VISIT (OUTPATIENT)
Dept: SURGERY | Facility: CLINIC | Age: 61
End: 2021-08-18
Attending: THORACIC SURGERY (CARDIOTHORACIC VASCULAR SURGERY)
Payer: COMMERCIAL

## 2021-08-18 DIAGNOSIS — K22.3 ESOPHAGEAL PERFORATION: Primary | ICD-10-CM

## 2021-08-18 PROCEDURE — 99024 POSTOP FOLLOW-UP VISIT: CPT | Mod: 95 | Performed by: THORACIC SURGERY (CARDIOTHORACIC VASCULAR SURGERY)

## 2021-08-18 RX ORDER — POLYETHYLENE GLYCOL 3350 17 G/17G
POWDER, FOR SOLUTION ORAL
COMMUNITY
Start: 2021-08-10

## 2021-08-18 RX ORDER — LISINOPRIL 10 MG/1
TABLET ORAL
COMMUNITY
Start: 2021-08-10

## 2021-08-18 NOTE — LETTER
"    8/18/2021         RE: Janet Urias  [address]  [place] [address]        Dear Colleague,    Thank you for referring your patient, Janet Urias, to the [facility]. Please see a copy of my visit note below.    Janet is a 61 year old who is being evaluated via a billable video visit.      How would you like to obtain your AVS? MyChart  If the video visit is dropped, the invitation should be resent by: Send to e-mail at: [email or web address]  Will anyone else be joining your video visit? No        I have reviewed and updated the patient's allergies and medication list.    Concerns: Call wife at [phone] if video disconnects.   Refills: None needed.       Vitals - Patient Reported  Weight (Patient Reported): 102.1 kg (225 lb)  Height (Patient Reported): 188 cm (6' 2\")  BMI (Based on Pt Reported Ht/Wt): 28.89    Christina Ghosh CMA    Video Start Time: 11:11  Video-Visit Details    Type of service:  Video Visit    Video End Time:11:29    Originating Location (pt. Location): Home    Distant Location (provider location):  [facility]     Platform used for Video Visit: [facility]    THORACIC SURGERY FOLLOW UP VISIT    I saw Mr. Janet Urias in follow-up today. The clinical summary follows:     DIAGNOSIS   Esophageal perforation after food impaction  Chronic dysphagia of unknown etiology  PROCEDURE   8/4/21 EGD, esophagram, PEG-J, pharyngostomy tube.   8/9/21 EGD, pharyngostomy removal.     COMPLICATIONS  None    INTERVAL STUDIES  8/17/21 Esophagram        Past Medical History:   Diagnosis Date     Hypertension      Past Surgical History:   Procedure Laterality Date     ESOPHAGECTOMY N/A 8/4/2021    Procedure: Esophagogastroduodenoscopy, GJ tube placement, pharyngoscopy, removal of foreign body, esophogram, and bilateral chest tube placement;  Surgeon: Pool Aranda MD;  Location: [facility] OR     ESOPHAGOSCOPY, GASTROSCOPY, DUODENOSCOPY (EGD), COMBINED N/A " 8/9/2021    Procedure: ESOPHAGOGASTRODUODENOSCOPY (EGD);  Surgeon: Pool Aranda MD;  Location: [facility] OR     ORTHOPEDIC SURGERY       PHARYNGOSTOMY N/A 8/9/2021    Procedure: PHARYNGOSTOMY tube removal;  Surgeon: Pool Aranda MD;  Location: [facility] OR        Allergies   Allergen Reactions     Tegaderm Transparent Dressing (Informational Only) Blisters     Pt had blister after chest tube dressing removed.      Atorvastatin      Pravastatin      Simvastatin      Current Outpatient Medications   Medication     acetaminophen (TYLENOL) 160 MG/5ML solution     amoxicillin-clavulanate (AUGMENTIN) 400-57 MG/5ML suspension     Guar Gum (FIBER MODULAR, NUTRISOURCE FIBER,) packet     lisinopril (PRINIVIL,ZESTRIL) 1 MG/ML suspension     oxyCODONE (ROXICODONE) 5 MG/5ML solution     polyethylene glycol (MIRALAX) 17 g packet     protein modular (PROSOURCE TF) LIQD     Sennosides (SENNA) 8.8 MG/5ML SYRP     No current facility-administered medications for this visit.       Exam  Deferred (virtual visit).     ETOH None   TOB Non-smoker     SUBJECTIVE  Mr Urias is doing very well. His only complaint is loose BMs. He is still taking augmentin. He has tried increasing fiber in his tube feedings with minimal benefit. He is maintaining his weight. He has not had any fever or general malaise. He is on ice chips only by mouth.     From a personal perspective, he is at home with his wife Deb.     IMPRESSION   61 year old male patient 2 weeks after esophageal perforation after food impaction, managed conservatively.     PLAN  I spent 30 min on the date of the encounter in chart review, patient visit, review of tests, documentation and/or discussion with other providers about the issues documented above. I reviewed the plan as follows:  Esophageal perforation: Stop augmentin today. F/u in 2 weeks and remove GJ.   Diet: Start clear liquid diet today, soft mechanical diet tomorrow. Decrease tube feedings to 5 cans a day x 2 days.  Then 2 cans a day for 5 days, then stop.   Diarrhea: We will f/u later on this week after stopping antibiotics and introducing oral diet. If not better will have him check for C. Diff in [facility].   Necessary Tests & Appointments: He will need a referral to GI for further work up of chronic dysphagia.   Follow up with DOROTHEA in 2-4 weeks for GJ tube removal.   All questions were answered and the patient and present family were in agreement with the plan.  I appreciate the opportunity to participate in the care of your patient and will keep you updated.  Sincerely,    Pool Glass MD        Again, thank you for allowing me to participate in the care of your patient.        Sincerely,        Yoseph Howard MD

## 2021-08-18 NOTE — PROGRESS NOTES
"Janet is a 61 year old who is being evaluated via a billable video visit.      How would you like to obtain your AVS? MyChart  If the video visit is dropped, the invitation should be resent by: Send to e-mail at: [email or web address]  Will anyone else be joining your video visit? No        I have reviewed and updated the patient's allergies and medication list.    Concerns: Call wife at [phone] if video disconnects.   Refills: None needed.       Vitals - Patient Reported  Weight (Patient Reported): 102.1 kg (225 lb)  Height (Patient Reported): 188 cm (6' 2\")  BMI (Based on Pt Reported Ht/Wt): 28.89    Christina Ghosh CMA    Video Start Time: 11:11  Video-Visit Details    Type of service:  Video Visit    Video End Time:11:29    Originating Location (pt. Location): Home    Distant Location (provider location):  [facility]     Platform used for Video Visit: [facility]    THORACIC SURGERY FOLLOW UP VISIT    I saw Mr. Janet Urias in follow-up today. The clinical summary follows:     DIAGNOSIS   Esophageal perforation after food impaction  Chronic dysphagia of unknown etiology  PROCEDURE   8/4/21 EGD, esophagram, PEG-J, pharyngostomy tube.   8/9/21 EGD, pharyngostomy removal.     COMPLICATIONS  None    INTERVAL STUDIES  8/17/21 Esophagram        Past Medical History:   Diagnosis Date     Hypertension      Past Surgical History:   Procedure Laterality Date     ESOPHAGECTOMY N/A 8/4/2021    Procedure: Esophagogastroduodenoscopy, GJ tube placement, pharyngoscopy, removal of foreign body, esophogram, and bilateral chest tube placement;  Surgeon: Pool Aranda MD;  Location: [facility] OR     ESOPHAGOSCOPY, GASTROSCOPY, DUODENOSCOPY (EGD), COMBINED N/A 8/9/2021    Procedure: ESOPHAGOGASTRODUODENOSCOPY (EGD);  Surgeon: Pool Aranda MD;  Location: [facility] OR     ORTHOPEDIC SURGERY       PHARYNGOSTOMY N/A 8/9/2021    Procedure: PHARYNGOSTOMY tube removal;  Surgeon: Pool Aranda MD;  " Location: [facility] OR        Allergies   Allergen Reactions     Tegaderm Transparent Dressing (Informational Only) Blisters     Pt had blister after chest tube dressing removed.      Atorvastatin      Pravastatin      Simvastatin      Current Outpatient Medications   Medication     acetaminophen (TYLENOL) 160 MG/5ML solution     amoxicillin-clavulanate (AUGMENTIN) 400-57 MG/5ML suspension     Guar Gum (FIBER MODULAR, NUTRISOURCE FIBER,) packet     lisinopril (PRINIVIL,ZESTRIL) 1 MG/ML suspension     oxyCODONE (ROXICODONE) 5 MG/5ML solution     polyethylene glycol (MIRALAX) 17 g packet     protein modular (PROSOURCE TF) LIQD     Sennosides (SENNA) 8.8 MG/5ML SYRP     No current facility-administered medications for this visit.       Exam  Deferred (virtual visit).     ETOH None   TOB Non-smoker     SUBJECTIVE  Mr Urias is doing very well. His only complaint is loose BMs. He is still taking augmentin. He has tried increasing fiber in his tube feedings with minimal benefit. He is maintaining his weight. He has not had any fever or general malaise. He is on ice chips only by mouth.     From a personal perspective, he is at home with his wife Deb.     IMPRESSION   61 year old male patient 2 weeks after esophageal perforation after food impaction, managed conservatively.     PLAN  I spent 30 min on the date of the encounter in chart review, patient visit, review of tests, documentation and/or discussion with other providers about the issues documented above. I reviewed the plan as follows:  Esophageal perforation: Stop augmentin today. F/u in 2 weeks and remove GJ.   Diet: Start clear liquid diet today, soft mechanical diet tomorrow. Decrease tube feedings to 5 cans a day x 2 days. Then 2 cans a day for 5 days, then stop.   Diarrhea: We will f/u later on this week after stopping antibiotics and introducing oral diet. If not better will have him check for C. Diff in [facility].   Necessary Tests & Appointments: He will need a  referral to GI for further work up of chronic dysphagia.   Follow up with DOROTHEA in 2-4 weeks for GJ tube removal.   All questions were answered and the patient and present family were in agreement with the plan.  I appreciate the opportunity to participate in the care of your patient and will keep you updated.  Sincerely,    Pool Glass MD

## 2021-08-20 ENCOUNTER — TELEPHONE (OUTPATIENT)
Dept: SURGERY | Facility: CLINIC | Age: 61
End: 2021-08-20

## 2021-08-20 DIAGNOSIS — R19.7 DIARRHEA, UNSPECIFIED TYPE: ICD-10-CM

## 2021-08-20 DIAGNOSIS — K22.3 ESOPHAGEAL PERFORATION: Primary | ICD-10-CM

## 2021-08-20 RX ORDER — OMEPRAZOLE 40 MG/1
CAPSULE, DELAYED RELEASE ORAL
Qty: 30 CAPSULE | Refills: 3
Start: 2021-08-20

## 2021-08-20 NOTE — PROGRESS NOTES
This is a recent snapshot of the patient's [facility] medical record.  For current drug dose and complete information and questions, call [phone] or In Basket pool, [facility] home infusion ([id number])  CSN Number:  [id number]

## 2021-08-20 NOTE — TELEPHONE ENCOUNTER
----- Message from ELVIN Edgar sent at 8/19/2021  8:13 AM CDT -----  Regarding: Call Friday    ----- Message -----  From: Pool Aranda MD  Sent: 8/18/2021  12:50 PM CDT  To: ELVIN Edgar  Subject: [facility],     I saw this yojana in clinic. He is having a decent amount of diarrhea. I told him we would call him Friday to see how he is doing after stopping antibiotics and introducing oral diet. If he is no better, could you please order a C Diff tests to be done somewhere close to where he lives and if negative we can give him Imodium.     Also, could you please see him in 2 weeks for a GJ removal? I talked to him over the phone to tell him how to transition his tube feedings.     Thank you!    Pool   ~~~~~~~~~~~~~~~~~~~~~~~~~~~~~  8/20/21  I called Janet to get an update on his diarrhea and to see if this has improved after stopping antibiotics and starting an oral diet.    He said he is still having about 8 loose stools daily, occasionally it is a bit more formed but mostly not.   He has eaten scrambled eggs, yogurt, puddings, oatmeal and apple sauce.  He also had been drinking Gatorade.  He is down to 4 cartons of formula daily and will drop to 3 on Monday, stop all next Friday.   I will see him 9/1 for removal of GJ tube.  We talked about how to gradually advance his diet with some food suggestions including steamed vegetables, mashed potatoes, moist meats like tuna, fish, broasted chicken, hot dogs.       I told him that I will be faxing an order for a stool culture (C. Diff) to his local [facility] clinic and, if this is negative, he can start taking Imodium to help with his diarrhea.  I will ask the clinic to call him directly and hopefully he can get in today to bring a specimen.

## 2021-08-22 ENCOUNTER — HEALTH MAINTENANCE LETTER (OUTPATIENT)
Age: 61
End: 2021-08-22

## 2021-08-23 DIAGNOSIS — R13.10 DYSPHAGIA: Primary | ICD-10-CM

## 2021-08-23 NOTE — PROGRESS NOTES
This is a recent snapshot of the patient's [facility] medical record.  For current drug dose and complete information and questions, call [phone] or In Basket pool, [facility] home infusion ([id number])  CSN Number:  [id number]

## 2021-08-24 ENCOUNTER — PATIENT OUTREACH (OUTPATIENT)
Dept: SURGERY | Facility: CLINIC | Age: 61
End: 2021-08-24

## 2021-08-24 ENCOUNTER — TELEPHONE (OUTPATIENT)
Dept: GASTROENTEROLOGY | Facility: CLINIC | Age: 61
End: 2021-08-24

## 2021-08-24 NOTE — TELEPHONE ENCOUNTER
[facility] Call Center    Phone Message    May a detailed message be left on voicemail: yes     Reason for Call: Other: Patient being referred for Dysphagia and is not experiencing significant weight loss and first available is 30+ days out. Please review per scheduling guidelines. Thanks!     Action Taken: Message routed to:  [facility] ([facility] GI    Travel Screening: Not Applicable

## 2021-08-24 NOTE — PROGRESS NOTES
"Called patient to follow up on previous phone call with Evelia Mitchell. Pt reports that he was never contacted by [facility] in [place] to submit a stool sample for C-diff this past Friday.    Patient reports that since stopping the Tube Feeding on Saturday evening, he is no longer experiencing diarrhea.  He states that \"since he stopped the Tube Feedings, he's been feeling great and tolerating a soft diet\".  Reports his last loose stool was Saturday morning and he has been having 1-2 soft formed stools a day since then.  I instructed patient to call if he begins to experience diarrhea again.     Yocasta Munson RN on 8/24/2021 at 2:30 PM    "

## 2021-08-26 ENCOUNTER — TELEPHONE (OUTPATIENT)
Dept: GASTROENTEROLOGY | Facility: CLINIC | Age: 61
End: 2021-08-26

## 2021-08-26 NOTE — PROGRESS NOTES
This is a recent snapshot of the patient's [facility] medical record.  For current drug dose and complete information and questions, call [phone] or In Basket pool, [facility] home infusion ([id number])  CSN Number:  [id number]

## 2021-08-26 NOTE — TELEPHONE ENCOUNTER
Called to schedule new referral. Talked with spouse Deb. Pt and Deb will discuss whether to seek care in [facility] or near [place] then call back pod K.    Santo Morales. Kirit or Marcelle CONNER.

## 2021-09-01 ENCOUNTER — OFFICE VISIT (OUTPATIENT)
Dept: SURGERY | Facility: CLINIC | Age: 61
End: 2021-09-01
Attending: CLINICAL NURSE SPECIALIST
Payer: COMMERCIAL

## 2021-09-01 VITALS
DIASTOLIC BLOOD PRESSURE: 86 MMHG | WEIGHT: 225.8 LBS | HEART RATE: 84 BPM | TEMPERATURE: 98.1 F | BODY MASS INDEX: 28.99 KG/M2 | SYSTOLIC BLOOD PRESSURE: 135 MMHG | OXYGEN SATURATION: 97 %

## 2021-09-01 DIAGNOSIS — K22.3 ESOPHAGEAL PERFORATION: Primary | ICD-10-CM

## 2021-09-01 PROCEDURE — G0463 HOSPITAL OUTPT CLINIC VISIT: HCPCS

## 2021-09-01 PROCEDURE — 99024 POSTOP FOLLOW-UP VISIT: CPT | Performed by: CLINICAL NURSE SPECIALIST

## 2021-09-01 ASSESSMENT — PAIN SCALES - GENERAL: PAINLEVEL: NO PAIN (0)

## 2021-09-01 NOTE — LETTER
9/1/2021         RE: Janet Urias  [address]  [place] [address]        Dear Colleague,    Thank you for referring your patient, Janet Urias, to the [facility]. Please see a copy of my visit note below.    REASON FOR VISIT:  Removal of GJ tube    PROCEDURES PERFORMED:   8/4 EGD, GJ tube placement, pharyngoscopy, removal of foreign body, esophogram, and bilateral chest tube placement.      8/9: EGD w/ removal of pharyngostomy tube     SURGEON:  Dr. Pool Hameed    History of Present Illness:  Patient was recently transferred to our facility after experiencing an esophageal perforation.   He had been receiving supplemental enteral formula which he has weaned off over the past month.   His weight is stable and he is not experiencing any dysphagia.    Assessment:  After discussion the removal and after care, his GJ tube was removed without complications.   The tube was inspected and found to be intact.    A gauze dressing was applied and secured.    Plan:  Call or return for any concerns or issues.    Plan is to find a GI provider locally to follow-up with.    Total time:  30 minutes      Again, thank you for allowing me to participate in the care of your patient.        Sincerely,        ELVIN Edgar CNS

## 2021-09-01 NOTE — NURSING NOTE
"Oncology Rooming Note    September 1, 2021 10:35 AM   Janet Urias is a 61 year old male who presents for:    Chief Complaint   Patient presents with     Oncology Clinic Visit     Pt is here for a rtn for GJ Tube     Initial Vitals: Blood Pressure 135/86   Pulse 84   Temperature 98.1  F (36.7  C) (Oral)   Weight 102.4 kg (225 lb 12.8 oz)   Oxygen Saturation 97%   Body Mass Index 28.99 kg/m   Estimated body mass index is 28.99 kg/m  as calculated from the following:    Height as of 8/4/21: 1.88 m (6' 2\").    Weight as of this encounter: 102.4 kg (225 lb 12.8 oz). Body surface area is 2.31 meters squared.  No Pain (0) Comment: Data Unavailable   No LMP for male patient.  Allergies reviewed: Yes  Medications reviewed: Yes    Medications: Medication refills not needed today.  Pharmacy name entered into EPIC: Data Unavailable    Clinical concerns: none       Neema Jordan MA            "

## 2021-09-01 NOTE — PATIENT INSTRUCTIONS
Keep the site clean and change the gauze at least daily  It is OK to shower.   Do not immerse in water (tub, pool, lake, etc) until this site is completely healed (10-14 days)    There are no dietary restrictions    You may hear air escape from this opening for a week or so

## 2021-09-01 NOTE — PROGRESS NOTES
REASON FOR VISIT:  Removal of GJ tube    PROCEDURES PERFORMED:   8/4 EGD, GJ tube placement, pharyngoscopy, removal of foreign body, esophogram, and bilateral chest tube placement.      8/9: EGD w/ removal of pharyngostomy tube     SURGEON:  Dr. Pool Hameed    History of Present Illness:  Patient was recently transferred to our facility after experiencing an esophageal perforation.   He had been receiving supplemental enteral formula which he has weaned off over the past month.   His weight is stable and he is not experiencing any dysphagia.    Assessment:  After discussion the removal and after care, his GJ tube was removed without complications.   The tube was inspected and found to be intact.    A gauze dressing was applied and secured.    Plan:  Call or return for any concerns or issues.    Plan is to find a GI provider locally to follow-up with.    Total time:  30 minutes

## 2021-09-23 DIAGNOSIS — R13.10 DYSPHAGIA, UNSPECIFIED TYPE: Primary | ICD-10-CM

## 2021-09-23 NOTE — H&P
THORACIC SURGERY HISTORY AND PHYSICAL    HPI:   Mr Urias is a 61M who was transferred from an OSH on account of an esophageal mass with perforation. He presented to the OSH with severe chest and back pain of about 12 hours duration. Apparently, he was eating some steak for lunch that got stuck in his throat. He transiently felt better, but his symptoms worsened after dinner. The pain is described as severe in the mid chest and radiates to the back. He reports an episode of nausea and vomiting. Mr Urias has a history of dysphagia, no history of weight loss, GI bleeding, fever, chills. No prior upper endoscopy. At the OSH, he had a CT of the chest that revealed findings concerning for a mass at the GE junction with concerns for an esophageal rupture. He was placed on IV antibiotics and antifungals, and transferred to the [facility] where thoracic surgery was consulted for evaluation and management.     PMH:  Past Medical History:   Diagnosis Date     Hypertension      PSH:    Orthopedic surgery    FH:   Not contributory     SH:  Tobacco use - None  EtOH use - None  Illicit drug use - None    Allergies:  Allergies   Allergen Reactions     Atorvastatin      Pravastatin      Simvastatin      Tegaderm Transparent Dressing (Informational Only) Blisters     Pt had blister after chest tube dressing removed.        Home Meds:  No medications prior to admission.   Lisinopril-Hydrochlorothiazide    ROS:   Constitutional: Negative for fever.   HENT: Negative for congestion.    Eyes: Negative for visual disturbance.   Respiratory: Negative for shortness of breath.    Cardiovascular: Negative.   Gastrointestinal: Positive for dysphagia, nausea and vomiting  Endocrine: Negative for polyuria.   Genitourinary: Negative for dysuria.   Musculoskeletal: Positive for chest and back pain.   Skin: Negative for rash.   Allergic/Immunologic: Negative for immunocompromised state.   Neurological: Negative for dizziness.    Hematological: Does not bruise/bleed easily.   Psychiatric/Behavioral: Negative for confusion.     Physical Exam:     Gen: NAD, resting comfortably in bed  Lungs: CTAB, non-labored breathing  CV: regular rhythm, tachycardia  Abd: full, soft, non-tender, non-distended  Ext: No peripheral edema  Neuro: AOx3    Labs:  ABG No lab results found in last 7 days.  CBCNo lab results found in last 7 days.  BMPNo lab results found in last 7 days.  LFTNo lab results found in last 7 days.  PancreasNo lab results found in last 7 days.    Imaging:  CT chest (8/4/2021) - distal esophageal mass, concerning for a malignancy with rupture, evidence of mediastinitis.     A/P: Patient is a 61 year old male with a distal esophageal mass with suspected Rupture and mediastinitis.     - Patient seen with Dr. Howard.   - Plan for emergent transfer to the OR for EGD, possible esophageal stent, possible thoracotomy, possible laparotomy, possible esophageal diversion, possible esophagectomy, possible gastrostomy, possible jejunostomy tube, possible pharyngostomy.   - IV antibiotics and antifungals.  - Informed consent was obtained. All questions were anasered.     Patient and plan discussed with attending, Dr. Howard.      Abhi Malik MD, Cardiothoracic surgery fellow

## 2021-09-23 NOTE — CONFIDENTIAL NOTE
Upon patient request, a GI referral and relevant recent records will be faxed to the [facility]/GI Department to arrange further evaluation for his chronic dysphagia.  Fax [phone]

## 2021-10-17 ENCOUNTER — HEALTH MAINTENANCE LETTER (OUTPATIENT)
Age: 61
End: 2021-10-17

## 2022-04-19 NOTE — PLAN OF CARE
"BP (!) 178/108 (BP Location: Right arm)   Pulse 77   Temp 98.1  F (36.7  C) (Oral)   Resp 16   Ht 1.88 m (6' 2\")   Wt 101.5 kg (223 lb 12.3 oz)   SpO2 98%   BMI 28.73 kg/m      Cal [place] regarding /108 at shift change, hadn't heard/seen any new orders at this time, denied need for pain medication during shift, pharynogostomy with yellow output site cdi, g/j-tube site cdi, g-tube to gravity with 175 ml out for 12 hours, J-tube with tube feeds at 40 ml/hr. Needs help to unplug cords but otherwise walks to bathroom on his own, voiding not saving, zosyn and vanco given, continue with plan of care  " +left foot fracture, +aches

## 2022-10-03 ENCOUNTER — HEALTH MAINTENANCE LETTER (OUTPATIENT)
Age: 62
End: 2022-10-03

## 2023-10-21 ENCOUNTER — HEALTH MAINTENANCE LETTER (OUTPATIENT)
Age: 63
End: 2023-10-21

## 2024-02-17 NOTE — PROGRESS NOTES
This is a recent snapshot of the patient's [facility] medical record.  For current drug dose and complete information and questions, call [phone] or In Basket pool, [facility] ([id number])  CSN Number:  [id number]       Attending Attestation (For Attendings USE Only)...

## (undated) DEVICE — SYR 10ML SLIP TIP W/O NDL 303134

## (undated) DEVICE — TUBE GASTROSTOMY PONSKY PULL DELUXE 20FR 000792

## (undated) DEVICE — DRSG GAUZE 4X4" TRAY 6939

## (undated) DEVICE — SYR 10ML LL W/O NDL 302995

## (undated) DEVICE — LINEN TOWEL PACK X5 5464

## (undated) DEVICE — SYR ENTERAL W/ENFIT CONNECTOR PURPLE 60ML 460SE

## (undated) DEVICE — SU VICRYL 2-0 TIE 54" J615H

## (undated) DEVICE — DRAIN CHEST TUBE 28FR STR 8028

## (undated) DEVICE — CATH TRAY FOLEY SURESTEP 16FR W/URNE MTR STLK LATEX A303316A

## (undated) DEVICE — ENDO CAP AND TUBING STERILE FOR ENDOGATOR  100130

## (undated) DEVICE — JELLY LUBRICATING SURGILUBE 2OZ TUBE

## (undated) DEVICE — SYR PISTON URETHRAL 60ML 68000

## (undated) DEVICE — LINEN TOWEL PACK X6 WHITE 5487

## (undated) DEVICE — LINEN GOWN XLG 5407

## (undated) DEVICE — KIT ENDO FIRST STEP DISINFECTANT 200ML W/POUCH EP-4

## (undated) DEVICE — SU SILK 3-0 SH CR 8X18" C013D

## (undated) DEVICE — SUCTION DRY CHEST DRAIN OASIS 3600-100

## (undated) DEVICE — SYR 30ML SLIP TIP W/O NDL 302833

## (undated) DEVICE — DRAPE IOBAN INCISE 23X17" 6650EZ

## (undated) DEVICE — KIT CONNECTOR FOR OLYMPUS ENDOSCOPES DEFENDO 100310

## (undated) DEVICE — DEVICE SUTURE PASSER 14GA WECK EFX EFXSP2

## (undated) DEVICE — TUBING SUCTION 10'X3/16" N510

## (undated) DEVICE — LINEN TOWEL PACK X30 5481

## (undated) DEVICE — BLADE KNIFE SURG 15 371115

## (undated) DEVICE — DRAPE SHEET REV FOLD 3/4 9349

## (undated) DEVICE — SU VICRYL 4-0 PS-2 18" UND J496H

## (undated) DEVICE — DEVICE RETRIEVAL ROTH NET PLATINUM UNIV 2.5MMX230CM 00715050

## (undated) DEVICE — SU VICRYL 2-0 SH 27" UND J417H

## (undated) DEVICE — RAD KNIFE HANDLE W/11 BLADE DISPOSABLE 371611

## (undated) DEVICE — WIPES FOLEY CARE SURESTEP PROVON DFC100

## (undated) DEVICE — LUBRICANT INST KIT ENDO-LUBE 220-90

## (undated) DEVICE — WIRE GUIDE AMPLATZ SUPER STIFF 0.035"X260CM M001465261

## (undated) DEVICE — ESU ELEC BLADE 6" COATED/INSULATED E1455-6

## (undated) DEVICE — TUBE NASOGASTRIC 18FR 48" 2 LUMEN 8888266148

## (undated) DEVICE — ESU ELEC BLADE 2.75" COATED/INSULATED E1455

## (undated) DEVICE — ADH SKIN CLOSURE PREMIERPRO EXOFIN 1.0ML 3470

## (undated) DEVICE — SOL NACL 0.9% IRRIG 1000ML BOTTLE 2F7124

## (undated) DEVICE — BLADE KNIFE SURG 10 371110

## (undated) DEVICE — SU SILK 0 SH 30" K834H

## (undated) DEVICE — BLADE CLIPPER SGL USE 9680

## (undated) DEVICE — ENDO SYSTEM WATER BOTTLE & TUBING W/CO2 FILTER 00711549

## (undated) DEVICE — TUBE TRANS GASTROJEJUNOSTOMY ENFIT 18FRX45CM 8250-18

## (undated) DEVICE — SOL WATER IRRIG 1000ML BOTTLE 2F7114

## (undated) DEVICE — INTR PEELAWAY 20FRX20CM G06445 PLVW-20.0-38

## (undated) DEVICE — DRAIN PENROSE 3/8X18" LATEX 0918020

## (undated) DEVICE — SU SILK 0 TIE 6X30" A306H

## (undated) DEVICE — SUCTION MANIFOLD NEPTUNE 2 SYS 4 PORT 0702-020-000

## (undated) DEVICE — Device

## (undated) DEVICE — SU VICRYL 3-0 SH 27" J316H

## (undated) DEVICE — ESU GROUND PAD ADULT W/CORD E7507

## (undated) DEVICE — PREP CHLORAPREP 26ML TINTED ORANGE  260815

## (undated) RX ORDER — FENTANYL CITRATE 50 UG/ML
INJECTION, SOLUTION INTRAMUSCULAR; INTRAVENOUS
Status: DISPENSED
Start: 2021-08-04

## (undated) RX ORDER — EPHEDRINE SULFATE 50 MG/ML
INJECTION, SOLUTION INTRAMUSCULAR; INTRAVENOUS; SUBCUTANEOUS
Status: DISPENSED
Start: 2021-08-04

## (undated) RX ORDER — ONDANSETRON 2 MG/ML
INJECTION INTRAMUSCULAR; INTRAVENOUS
Status: DISPENSED
Start: 2021-08-09

## (undated) RX ORDER — LIDOCAINE HYDROCHLORIDE 20 MG/ML
INJECTION, SOLUTION EPIDURAL; INFILTRATION; INTRACAUDAL; PERINEURAL
Status: DISPENSED
Start: 2021-08-04

## (undated) RX ORDER — PROPOFOL 10 MG/ML
INJECTION, EMULSION INTRAVENOUS
Status: DISPENSED
Start: 2021-08-04

## (undated) RX ORDER — LIDOCAINE HYDROCHLORIDE 20 MG/ML
INJECTION, SOLUTION EPIDURAL; INFILTRATION; INTRACAUDAL; PERINEURAL
Status: DISPENSED
Start: 2021-08-09

## (undated) RX ORDER — LABETALOL HYDROCHLORIDE 5 MG/ML
INJECTION, SOLUTION INTRAVENOUS
Status: DISPENSED
Start: 2021-08-04

## (undated) RX ORDER — HYDROMORPHONE HYDROCHLORIDE 1 MG/ML
INJECTION, SOLUTION INTRAMUSCULAR; INTRAVENOUS; SUBCUTANEOUS
Status: DISPENSED
Start: 2021-08-04

## (undated) RX ORDER — HALOPERIDOL 5 MG/ML
INJECTION INTRAMUSCULAR
Status: DISPENSED
Start: 2021-08-04

## (undated) RX ORDER — IOPAMIDOL 755 MG/ML
INJECTION, SOLUTION INTRAVASCULAR
Status: DISPENSED
Start: 2021-08-04

## (undated) RX ORDER — FENTANYL CITRATE 50 UG/ML
INJECTION, SOLUTION INTRAMUSCULAR; INTRAVENOUS
Status: DISPENSED
Start: 2021-08-09

## (undated) RX ORDER — FENTANYL CITRATE-0.9 % NACL/PF 10 MCG/ML
PLASTIC BAG, INJECTION (ML) INTRAVENOUS
Status: DISPENSED
Start: 2021-08-04

## (undated) RX ORDER — ONDANSETRON 2 MG/ML
INJECTION INTRAMUSCULAR; INTRAVENOUS
Status: DISPENSED
Start: 2021-08-04